# Patient Record
Sex: MALE | Race: WHITE | NOT HISPANIC OR LATINO | URBAN - NONMETROPOLITAN AREA
[De-identification: names, ages, dates, MRNs, and addresses within clinical notes are randomized per-mention and may not be internally consistent; named-entity substitution may affect disease eponyms.]

---

## 2019-08-02 ENCOUNTER — SPOT CHECK NEW (OUTPATIENT)
Dept: URBAN - NONMETROPOLITAN AREA CLINIC 5 | Facility: CLINIC | Age: 84
Setting detail: DERMATOLOGY
End: 2019-08-02

## 2019-08-02 DIAGNOSIS — L57.0 ACTINIC KERATOSIS: ICD-10-CM

## 2019-08-02 PROCEDURE — 11102 TANGNTL BX SKIN SINGLE LES: CPT

## 2019-08-09 ENCOUNTER — MOHS SURGERY-ROUTINE (OUTPATIENT)
Dept: URBAN - METROPOLITAN AREA CLINIC 15 | Facility: CLINIC | Age: 84
Setting detail: DERMATOLOGY
End: 2019-08-09

## 2019-08-09 DIAGNOSIS — D48.5 NEOPLASM OF UNCERTAIN BEHAVIOR OF SKIN: ICD-10-CM

## 2019-08-09 PROCEDURE — 13122 CMPLX RPR S/A/L ADDL 5 CM/>: CPT

## 2019-08-09 PROCEDURE — 17313 MOHS 1 STAGE T/A/L: CPT

## 2019-08-09 PROCEDURE — 13121 CMPLX RPR S/A/L 2.6-7.5 CM: CPT

## 2019-08-15 ENCOUNTER — MOHS SURGERY - FOLLOW UP (OUTPATIENT)
Dept: URBAN - METROPOLITAN AREA CLINIC 15 | Facility: CLINIC | Age: 84
Setting detail: DERMATOLOGY
End: 2019-08-15

## 2019-08-15 DIAGNOSIS — L30.9 DERMATITIS, UNSPECIFIED: ICD-10-CM

## 2019-08-15 DIAGNOSIS — L70.0 ACNE VULGARIS: ICD-10-CM

## 2019-08-15 PROCEDURE — 99024 POSTOP FOLLOW-UP VISIT: CPT

## 2019-08-22 ENCOUNTER — SUTURE REMOVAL (OUTPATIENT)
Dept: URBAN - METROPOLITAN AREA CLINIC 15 | Facility: CLINIC | Age: 84
Setting detail: DERMATOLOGY
End: 2019-08-22

## 2019-08-22 DIAGNOSIS — L57.0 ACTINIC KERATOSIS: ICD-10-CM

## 2019-08-22 PROCEDURE — 99024 POSTOP FOLLOW-UP VISIT: CPT

## 2023-09-02 ENCOUNTER — HOSPITAL ENCOUNTER (OUTPATIENT)
Dept: TELEMEDICINE | Facility: HOSPITAL | Age: 88
Discharge: HOME | End: 2023-09-02
Payer: MEDICARE

## 2023-09-02 ENCOUNTER — ANESTHESIA (OUTPATIENT)
Dept: INTERVENTIONAL RADIOLOGY/VASCULAR | Facility: HOSPITAL | Age: 88
DRG: 023 | End: 2023-09-02
Payer: MEDICARE

## 2023-09-02 ENCOUNTER — HOSPITAL ENCOUNTER (INPATIENT)
Facility: HOSPITAL | Age: 88
LOS: 4 days | Discharge: NURSING, REHAB OR LONG-TERM CARE | DRG: 023 | End: 2023-09-06
Attending: EMERGENCY MEDICINE | Admitting: INTERNAL MEDICINE
Payer: MEDICARE

## 2023-09-02 DIAGNOSIS — R53.1 LEFT-SIDED WEAKNESS: ICD-10-CM

## 2023-09-02 DIAGNOSIS — I63.511 ACUTE ISCHEMIC RIGHT MCA STROKE: ICD-10-CM

## 2023-09-02 DIAGNOSIS — I63.411 STROKE DUE TO EMBOLISM OF RIGHT MIDDLE CEREBRAL ARTERY: Primary | ICD-10-CM

## 2023-09-02 DIAGNOSIS — I63.9 CEREBRAL INFARCTION, UNSPECIFIED MECHANISM: ICD-10-CM

## 2023-09-02 DIAGNOSIS — I63.9 CVA (CEREBRAL VASCULAR ACCIDENT): ICD-10-CM

## 2023-09-02 PROBLEM — Z95.810 AICD (AUTOMATIC CARDIOVERTER/DEFIBRILLATOR) PRESENT: Status: ACTIVE | Noted: 2023-09-02

## 2023-09-02 PROBLEM — G93.6 CYTOTOXIC BRAIN EDEMA: Status: ACTIVE | Noted: 2023-09-02

## 2023-09-02 PROBLEM — I71.40 ABDOMINAL AORTIC ANEURYSM (AAA) WITHOUT RUPTURE: Status: ACTIVE | Noted: 2023-09-02

## 2023-09-02 PROBLEM — I10 PRIMARY HYPERTENSION: Status: ACTIVE | Noted: 2023-09-02

## 2023-09-02 PROCEDURE — 99223 PR INITIAL HOSPITAL CARE,LEVL III: ICD-10-PCS | Mod: ,,, | Performed by: PSYCHIATRY & NEUROLOGY

## 2023-09-02 PROCEDURE — 99291 PR CRITICAL CARE, E/M 30-74 MINUTES: ICD-10-PCS | Mod: ,,,

## 2023-09-02 PROCEDURE — D9220A PRA ANESTHESIA: ICD-10-PCS | Mod: CRNA,,, | Performed by: STUDENT IN AN ORGANIZED HEALTH CARE EDUCATION/TRAINING PROGRAM

## 2023-09-02 PROCEDURE — 99285 EMERGENCY DEPT VISIT HI MDM: CPT | Mod: 25

## 2023-09-02 PROCEDURE — 20000000 HC ICU ROOM

## 2023-09-02 PROCEDURE — 99291 CRITICAL CARE FIRST HOUR: CPT | Mod: ,,,

## 2023-09-02 PROCEDURE — D9220A PRA ANESTHESIA: Mod: CRNA,,, | Performed by: STUDENT IN AN ORGANIZED HEALTH CARE EDUCATION/TRAINING PROGRAM

## 2023-09-02 PROCEDURE — 63600175 PHARM REV CODE 636 W HCPCS: Performed by: STUDENT IN AN ORGANIZED HEALTH CARE EDUCATION/TRAINING PROGRAM

## 2023-09-02 PROCEDURE — D9220A PRA ANESTHESIA: ICD-10-PCS | Mod: ANES,,, | Performed by: ANESTHESIOLOGY

## 2023-09-02 PROCEDURE — 99223 1ST HOSP IP/OBS HIGH 75: CPT | Mod: ,,, | Performed by: PSYCHIATRY & NEUROLOGY

## 2023-09-02 PROCEDURE — 25000003 PHARM REV CODE 250: Performed by: STUDENT IN AN ORGANIZED HEALTH CARE EDUCATION/TRAINING PROGRAM

## 2023-09-02 PROCEDURE — D9220A PRA ANESTHESIA: Mod: ANES,,, | Performed by: ANESTHESIOLOGY

## 2023-09-02 PROCEDURE — 31500 INSERT EMERGENCY AIRWAY: CPT

## 2023-09-02 RX ORDER — PROPOFOL 10 MG/ML
VIAL (ML) INTRAVENOUS
Status: DISCONTINUED | OUTPATIENT
Start: 2023-09-02 | End: 2023-09-02

## 2023-09-02 RX ORDER — HEPARIN SODIUM 5000 [USP'U]/ML
5000 INJECTION, SOLUTION INTRAVENOUS; SUBCUTANEOUS EVERY 8 HOURS
Status: DISCONTINUED | OUTPATIENT
Start: 2023-09-03 | End: 2023-09-04

## 2023-09-02 RX ORDER — ROCURONIUM BROMIDE 10 MG/ML
INJECTION, SOLUTION INTRAVENOUS
Status: DISCONTINUED | OUTPATIENT
Start: 2023-09-02 | End: 2023-09-02

## 2023-09-02 RX ORDER — LIDOCAINE HYDROCHLORIDE 20 MG/ML
INJECTION INTRAVENOUS
Status: DISCONTINUED | OUTPATIENT
Start: 2023-09-02 | End: 2023-09-02

## 2023-09-02 RX ORDER — ASPIRIN 81 MG/1
81 TABLET ORAL DAILY
Status: DISCONTINUED | OUTPATIENT
Start: 2023-09-03 | End: 2023-09-03

## 2023-09-02 RX ORDER — SODIUM CHLORIDE 0.9 % (FLUSH) 0.9 %
10 SYRINGE (ML) INJECTION
Status: DISCONTINUED | OUTPATIENT
Start: 2023-09-02 | End: 2023-09-06 | Stop reason: HOSPADM

## 2023-09-02 RX ORDER — DEXAMETHASONE SODIUM PHOSPHATE 4 MG/ML
INJECTION, SOLUTION INTRA-ARTICULAR; INTRALESIONAL; INTRAMUSCULAR; INTRAVENOUS; SOFT TISSUE
Status: DISCONTINUED | OUTPATIENT
Start: 2023-09-02 | End: 2023-09-02

## 2023-09-02 RX ORDER — ATORVASTATIN CALCIUM 40 MG/1
40 TABLET, FILM COATED ORAL DAILY
Status: DISCONTINUED | OUTPATIENT
Start: 2023-09-03 | End: 2023-09-06 | Stop reason: HOSPADM

## 2023-09-02 RX ORDER — SUCCINYLCHOLINE CHLORIDE 20 MG/ML
INJECTION INTRAMUSCULAR; INTRAVENOUS
Status: DISCONTINUED | OUTPATIENT
Start: 2023-09-02 | End: 2023-09-02

## 2023-09-02 RX ORDER — POLYETHYLENE GLYCOL 3350 17 G/17G
17 POWDER, FOR SOLUTION ORAL DAILY
Status: DISCONTINUED | OUTPATIENT
Start: 2023-09-03 | End: 2023-09-04

## 2023-09-02 RX ORDER — NICARDIPINE HYDROCHLORIDE 0.2 MG/ML
0-15 INJECTION INTRAVENOUS CONTINUOUS
Status: DISCONTINUED | OUTPATIENT
Start: 2023-09-02 | End: 2023-09-04

## 2023-09-02 RX ORDER — ACETAMINOPHEN 325 MG/1
650 TABLET ORAL EVERY 6 HOURS PRN
Status: DISCONTINUED | OUTPATIENT
Start: 2023-09-02 | End: 2023-09-06 | Stop reason: HOSPADM

## 2023-09-02 RX ORDER — PHENYLEPHRINE HYDROCHLORIDE 10 MG/ML
INJECTION INTRAVENOUS
Status: DISCONTINUED | OUTPATIENT
Start: 2023-09-02 | End: 2023-09-02

## 2023-09-02 RX ORDER — ONDANSETRON 2 MG/ML
INJECTION INTRAMUSCULAR; INTRAVENOUS
Status: DISCONTINUED | OUTPATIENT
Start: 2023-09-02 | End: 2023-09-02

## 2023-09-02 RX ADMIN — ROCURONIUM BROMIDE 5 MG: 10 INJECTION, SOLUTION INTRAVENOUS at 08:09

## 2023-09-02 RX ADMIN — PHENYLEPHRINE HYDROCHLORIDE 100 MCG: 10 INJECTION INTRAVENOUS at 09:09

## 2023-09-02 RX ADMIN — SUCCINYLCHOLINE CHLORIDE 140 MG: 20 INJECTION, SOLUTION INTRAMUSCULAR; INTRAVENOUS at 08:09

## 2023-09-02 RX ADMIN — ONDANSETRON 4 MG: 2 INJECTION INTRAMUSCULAR; INTRAVENOUS at 08:09

## 2023-09-02 RX ADMIN — PROPOFOL 20 MG: 10 INJECTION, EMULSION INTRAVENOUS at 09:09

## 2023-09-02 RX ADMIN — PHENYLEPHRINE HYDROCHLORIDE 50 MCG: 10 INJECTION INTRAVENOUS at 09:09

## 2023-09-02 RX ADMIN — ROCURONIUM BROMIDE 25 MG: 10 INJECTION, SOLUTION INTRAVENOUS at 08:09

## 2023-09-02 RX ADMIN — PROPOFOL 110 MG: 10 INJECTION, EMULSION INTRAVENOUS at 08:09

## 2023-09-02 RX ADMIN — LIDOCAINE HYDROCHLORIDE 100 MG: 20 INJECTION INTRAVENOUS at 08:09

## 2023-09-02 RX ADMIN — ROCURONIUM BROMIDE 20 MG: 10 INJECTION, SOLUTION INTRAVENOUS at 09:09

## 2023-09-02 RX ADMIN — SUGAMMADEX 200 MG: 100 INJECTION, SOLUTION INTRAVENOUS at 10:09

## 2023-09-02 RX ADMIN — DEXAMETHASONE SODIUM PHOSPHATE 4 MG: 4 INJECTION, SOLUTION INTRAMUSCULAR; INTRAVENOUS at 08:09

## 2023-09-02 NOTE — HPI
89M       1450 got up to use the restroom, fell to the ground  Had left sided weakness, left facial droop, and slurred speech    High functioning    Maybe on aspirin. No AC medications.     AICD, AAA, HTN, hypothyroid    /87

## 2023-09-02 NOTE — CONSULTS
[facility]  Vascular Neurology  Comprehensive Stroke Center  TeleVascular Neurology Acute Consultation Note      Consults    Consulting Provider: PEDRO HUMPHRIES  Current Providers  No providers found    [facility] Emergency Department  Spoke hospital nurse at bedside with patient assisting consultant.     Patient information was obtained from patient.         Assessment/Plan:       Diagnoses:   Neuro  Left-sided weakness  Stroke due to occlusion of right middle cerebral artery  89M presenting with left-sided weakness and slurred speech. LKN 1450. Initial NIHSS 4.     NCCTH reviewed with no acute intracranial hemorrhage or evidence of large territory ischemia. He presented within the therapeutic window for IV thrombolysis; however, given rapid improvement of his symptoms - NIHSS 2 for facial droop and mild dysarthria - plan to defer treatment at this time.     Imaging:   - Recommend an expedited CTA head/neck to evaluate for potential symptomatic stenosis/occlusive lesion that might significantly increase risk of recurrent or worsening signs/symptoms.   - Recommend follow-up non-urgent MRI brain without contrast to evaluate for acute ischemia.  - If above studies are abnormal, please load images to tel[employer] imaging system and contact us to review.    Antithrombotics for secondary stroke prevention: Load clopidogrel 300mg x 1 now. Then start dual-antiplatelet therapy with ASA 81mg and clopidogrel 75mg daily for 21 days. Then continue ASA 81mg indefinitely.     Statins for secondary stroke prevention and hyperlipidemia, if present: Recommend initiation or continuation of a high-intensity statin for goal LDL < 70mg/dL.    Aggressive risk factor modification: HTN     Rehab efforts: The patient has been evaluated by a stroke team provider and the therapy needs have been fully considered based off the  presenting complaints and exam findings. The following therapy evaluations are needed: PT evaluate and treat, OT evaluate and treat, SLP evaluate and treat, PM&R evaluate for appropriate placement    Diagnostics recommended:   - CTA head/neck with contrast  - MRI brain without contrast   - TTE without bubble study, monitor on telemetry while admitted  - Labs: hemoglobin A1c (goal <7%), lipid panel (LDL goal <70mg/dL), TSH  - Treat hyperglycemia to achieve a blood glucose level in a range of 140-180 mg/dL and to closely monitor to prevent hypoglycemia (Class IIa, Level C-LD).    VTE prophylaxis: Enoxaparin 40 mg SQ every 24 hours  Mechanical prophylaxis: Place SCDs    BP parameters: Infarct: No intervention, SBP <220      Please contact us with any further questions or concerns or if patient has any acute neurological changes (new symptoms, worsening deficits).          STROKE DOCUMENTATION     Acute Stroke Times:   Acute Stroke Times   Last Known Normal Date: 09/02/23  Last Known Normal Time: 1450  Symptom Onset Date: 09/02/23  Symptom Onset Time: 1450  Stroke Team Called Date: 09/02/23  Stroke Team Called Time: 1611  Stroke Team Arrival Date: 09/02/23  Stroke Team Arrival Time: 1613  CT Interpretation Time: 1621  Thrombolytic Therapy Recommended: No    NIH Scale:  Interval: baseline  1a. Level of Consciousness: 0-->Alert, keenly responsive  1b. LOC Questions: 0-->Answers both questions correctly  1c. LOC Commands: 0-->Performs both tasks correctly  2. Best Gaze: 0-->Normal  3. Visual: 0-->No visual loss  4. Facial Palsy: 2-->Partial paralysis (total or near-total paralysis of lower face)  5a. Motor Arm, Left: 0-->No drift, limb holds 90 (or 45) degrees for full 10 secs  5b. Motor Arm, Right: 0-->No drift, limb holds 90 (or 45) degrees for full 10 secs  6a. Motor Leg, Left: 1-->Drift, leg falls by the end of the 5-sec period but does not hit bed  6b. Motor Leg, Right: 0-->No drift, leg holds 30 degree position for  full 5 secs  7. Limb Ataxia: 0-->Absent  8. Sensory: 0-->Normal, no sensory loss  9. Best Language: 0-->No aphasia, normal  10. Dysarthria: 1-->Mild-to-moderate dysarthria, patient slurs at least some words and, at worst, can be understood with some difficulty  11. Extinction and Inattention (formerly Neglect): 0-->No abnormality  Total (NIH Stroke Scale): 4     Modified [place] Score: 0  [place] Coma Scale:    ABCD2 Score:    [id number] Score:   HAS -BLED Score:   ICH Score:   Hunt & Danielle Classification:       There were no vitals taken for this visit.  Eligible for thrombolytic therapy?: Yes  Thrombolytic therapy recomended: Thrombolytic therapy not recommended due to Mild Non-Disabling Symptoms  Possible Interventional Revascularization Candidate? Awaiting CTA results from Sp[facility] for determination     Disposition Recommendation: pending further studies    Subjective:     History of Present Illness:  89M with a PMHx significant for AAA, AICD, HTN, hypothyroidism presenting with left hemiparesis, left facial droop, and slurred speech. LKN 1450. He got up to use the bathroom and fell to the ground. His family found him down with symptoms.     Maybe on aspirin. No AC medications.     /87        On his exam, he has left lower facial weakness. Mild dysarthria with intelligible speech. BUE antigravity without drift. LLE antigravity with drift (which later improved). RLE antigravity without drift. No sensory symptoms.     Mild left LE weakness on confrontation exam. Patient was able to ambulate with minimal assist.     Recommended the emergency room physician to have a brief discussion with the patient and/or family if available regarding the  risks and benefits of treatment, and to briefly document the occurrence of that discussion in his clinical encounter note.     The attending portion of this evaluation, treatment, and documentation was performed per Florina Delvalle MD via audiovisual.    Billing code:   (non-intervention mild to moderate stroke, TIA, some mimics)      This patient has a critical neurological condition/illness, with some potential for high morbidity and mortality.  There is a moderate probability for acute neurological change leading to clinical and possibly life-threatening deterioration requiring highest level of physician preparedness for urgent intervention.  Care was coordinated with other physicians involved in the patient's care.  Radiologic studies and laboratory data were reviewed and interpreted, and plan of care was re-assessed based on the results.  Diagnosis, treatment options and prognosis may have been discussed with the patient and/or family members or caregiver.    In your opinion, this was a: Tier 1 Van Negative    Consult End Time: 5:14 PM     Florina Delvalle MD, PhD  [facility] Stroke Center  Vascular Neurology   [facility]

## 2023-09-03 PROBLEM — I61.9 INTRAPARENCHYMAL HEMORRHAGE OF BRAIN: Status: ACTIVE | Noted: 2023-09-03

## 2023-09-03 LAB
ABO + RH BLD: NORMAL
ALBUMIN SERPL BCP-MCNC: 3.6 G/DL (ref 3.5–5.2)
ALP SERPL-CCNC: 109 U/L (ref 55–135)
ALT SERPL W/O P-5'-P-CCNC: 16 U/L (ref 10–44)
ANION GAP SERPL CALC-SCNC: 11 MMOL/L (ref 8–16)
APTT PPP: 31.6 SEC (ref 21–32)
ASCENDING AORTA: 4.29 CM
AST SERPL-CCNC: 42 U/L (ref 10–40)
AV INDEX (PROSTH): 0.35
AV MEAN GRADIENT: 12 MMHG
AV PEAK GRADIENT: 18 MMHG
AV VALVE AREA BY VELOCITY RATIO: 1.74 CM²
AV VALVE AREA: 2.07 CM²
AV VELOCITY RATIO: 0.3
BASOPHILS # BLD AUTO: 0.02 K/UL (ref 0–0.2)
BASOPHILS # BLD AUTO: 0.02 K/UL (ref 0–0.2)
BASOPHILS NFR BLD: 0.2 % (ref 0–1.9)
BASOPHILS NFR BLD: 0.2 % (ref 0–1.9)
BILIRUB SERPL-MCNC: 0.6 MG/DL (ref 0.1–1)
BILIRUB UR QL STRIP: NEGATIVE
BLD GP AB SCN CELLS X3 SERPL QL: NORMAL
BSA FOR ECHO PROCEDURE: 1.89 M2
BUN SERPL-MCNC: 22 MG/DL (ref 10–30)
CALCIUM SERPL-MCNC: 8.9 MG/DL (ref 8.7–10.5)
CHLORIDE SERPL-SCNC: 105 MMOL/L (ref 95–110)
CHOLEST SERPL-MCNC: 162 MG/DL (ref 120–199)
CHOLEST/HDLC SERPL: 4.1 {RATIO} (ref 2–5)
CLARITY UR REFRACT.AUTO: CLEAR
CO2 SERPL-SCNC: 22 MMOL/L (ref 23–29)
COLOR UR AUTO: YELLOW
CREAT SERPL-MCNC: 1.1 MG/DL (ref 0.5–1.4)
CV ECHO LV RWT: 0.56 CM
DIFFERENTIAL METHOD: ABNORMAL
DIFFERENTIAL METHOD: ABNORMAL
DOP CALC AO PEAK VEL: 2.13 M/S
DOP CALC AO VTI: 37.95 CM
DOP CALC LVOT AREA: 5.9 CM2
DOP CALC LVOT DIAMETER: 2.74 CM
DOP CALC LVOT PEAK VEL: 0.63 M/S
DOP CALC LVOT STROKE VOLUME: 78.56 CM3
DOP CALCLVOT PEAK VEL VTI: 13.33 CM
E WAVE DECELERATION TIME: 234.4 MSEC
E/A RATIO: 1.41
E/E' RATIO: 10.13 M/S
ECHO LV POSTERIOR WALL: 1.18 CM (ref 0.6–1.1)
EOSINOPHIL # BLD AUTO: 0.1 K/UL (ref 0–0.5)
EOSINOPHIL # BLD AUTO: 0.1 K/UL (ref 0–0.5)
EOSINOPHIL NFR BLD: 1.3 % (ref 0–8)
EOSINOPHIL NFR BLD: 1.3 % (ref 0–8)
ERYTHROCYTE [DISTWIDTH] IN BLOOD BY AUTOMATED COUNT: 13.6 % (ref 11.5–14.5)
ERYTHROCYTE [DISTWIDTH] IN BLOOD BY AUTOMATED COUNT: 13.6 % (ref 11.5–14.5)
EST. GFR  (NO RACE VARIABLE): >60 ML/MIN/1.73 M^2
ESTIMATED AVG GLUCOSE: 108 MG/DL (ref 68–131)
FRACTIONAL SHORTENING: 28 % (ref 28–44)
GLUCOSE SERPL-MCNC: 130 MG/DL (ref 70–110)
GLUCOSE UR QL STRIP: NEGATIVE
HBA1C MFR BLD: 5.4 % (ref 4–5.6)
HCT VFR BLD AUTO: 39.4 % (ref 40–54)
HCT VFR BLD AUTO: 39.4 % (ref 40–54)
HDLC SERPL-MCNC: 40 MG/DL (ref 40–75)
HDLC SERPL: 24.7 % (ref 20–50)
HGB BLD-MCNC: 13.3 G/DL (ref 14–18)
HGB BLD-MCNC: 13.3 G/DL (ref 14–18)
HGB UR QL STRIP: NEGATIVE
IMM GRANULOCYTES # BLD AUTO: 0.03 K/UL (ref 0–0.04)
IMM GRANULOCYTES # BLD AUTO: 0.03 K/UL (ref 0–0.04)
IMM GRANULOCYTES NFR BLD AUTO: 0.3 % (ref 0–0.5)
IMM GRANULOCYTES NFR BLD AUTO: 0.3 % (ref 0–0.5)
INR PPP: 1 (ref 0.8–1.2)
INTERVENTRICULAR SEPTUM: 1.16 CM (ref 0.6–1.1)
IVRT: 94.2 MSEC
KETONES UR QL STRIP: NEGATIVE
LA MAJOR: 6.93 CM
LA MINOR: 6.87 CM
LA WIDTH: 5.18 CM
LDLC SERPL CALC-MCNC: 101.6 MG/DL (ref 63–159)
LEFT ATRIUM SIZE: 5.21 CM
LEFT ATRIUM VOLUME INDEX MOD: 64.7 ML/M2
LEFT ATRIUM VOLUME INDEX: 84.6 ML/M2
LEFT ATRIUM VOLUME MOD: 120.93 CM3
LEFT ATRIUM VOLUME: 158.28 CM3
LEFT INTERNAL DIMENSION IN SYSTOLE: 3.04 CM (ref 2.1–4)
LEFT VENTRICLE DIASTOLIC VOLUME INDEX: 41.92 ML/M2
LEFT VENTRICLE DIASTOLIC VOLUME: 78.39 ML
LEFT VENTRICLE MASS INDEX: 92 G/M2
LEFT VENTRICLE SYSTOLIC VOLUME INDEX: 19.4 ML/M2
LEFT VENTRICLE SYSTOLIC VOLUME: 36.23 ML
LEFT VENTRICULAR INTERNAL DIMENSION IN DIASTOLE: 4.2 CM (ref 3.5–6)
LEFT VENTRICULAR MASS: 171.69 G
LEUKOCYTE ESTERASE UR QL STRIP: NEGATIVE
LV LATERAL E/E' RATIO: 8.44 M/S
LV SEPTAL E/E' RATIO: 12.67 M/S
LYMPHOCYTES # BLD AUTO: 0.8 K/UL (ref 1–4.8)
LYMPHOCYTES # BLD AUTO: 0.8 K/UL (ref 1–4.8)
LYMPHOCYTES NFR BLD: 9 % (ref 18–48)
LYMPHOCYTES NFR BLD: 9 % (ref 18–48)
MAGNESIUM SERPL-MCNC: 2.1 MG/DL (ref 1.6–2.6)
MCH RBC QN AUTO: 32.7 PG (ref 27–31)
MCH RBC QN AUTO: 32.7 PG (ref 27–31)
MCHC RBC AUTO-ENTMCNC: 33.8 G/DL (ref 32–36)
MCHC RBC AUTO-ENTMCNC: 33.8 G/DL (ref 32–36)
MCV RBC AUTO: 97 FL (ref 82–98)
MCV RBC AUTO: 97 FL (ref 82–98)
MONOCYTES # BLD AUTO: 0.2 K/UL (ref 0.3–1)
MONOCYTES # BLD AUTO: 0.2 K/UL (ref 0.3–1)
MONOCYTES NFR BLD: 2.2 % (ref 4–15)
MONOCYTES NFR BLD: 2.2 % (ref 4–15)
MV PEAK A VEL: 0.54 M/S
MV PEAK E VEL: 0.76 M/S
NEUTROPHILS # BLD AUTO: 8 K/UL (ref 1.8–7.7)
NEUTROPHILS # BLD AUTO: 8 K/UL (ref 1.8–7.7)
NEUTROPHILS NFR BLD: 87 % (ref 38–73)
NEUTROPHILS NFR BLD: 87 % (ref 38–73)
NITRITE UR QL STRIP: NEGATIVE
NONHDLC SERPL-MCNC: 122 MG/DL
NRBC BLD-RTO: 0 /100 WBC
NRBC BLD-RTO: 0 /100 WBC
PH UR STRIP: 7 [PH] (ref 5–8)
PHOSPHATE SERPL-MCNC: 3 MG/DL (ref 2.7–4.5)
PISA TR MAX VEL: 2.6 M/S
PLATELET # BLD AUTO: 153 K/UL (ref 150–450)
PLATELET # BLD AUTO: 153 K/UL (ref 150–450)
PMV BLD AUTO: 9.9 FL (ref 9.2–12.9)
PMV BLD AUTO: 9.9 FL (ref 9.2–12.9)
POTASSIUM SERPL-SCNC: 4.2 MMOL/L (ref 3.5–5.1)
PROT SERPL-MCNC: 6.4 G/DL (ref 6–8.4)
PROT UR QL STRIP: NEGATIVE
PROTHROMBIN TIME: 10.9 SEC (ref 9–12.5)
RA MAJOR: 6.25 CM
RA PRESSURE ESTIMATED: 8 MMHG
RA WIDTH: 4.25 CM
RBC # BLD AUTO: 4.07 M/UL (ref 4.6–6.2)
RBC # BLD AUTO: 4.07 M/UL (ref 4.6–6.2)
RIGHT VENTRICULAR END-DIASTOLIC DIMENSION: 3.16 CM
RV TB RVSP: 11 MMHG
SINUS: 4.06 CM
SODIUM SERPL-SCNC: 138 MMOL/L (ref 136–145)
SP GR UR STRIP: >1.03 (ref 1–1.03)
SPECIMEN OUTDATE: NORMAL
STJ: 3.41 CM
T4 FREE SERPL-MCNC: 0.98 NG/DL (ref 0.71–1.51)
TDI LATERAL: 0.09 M/S
TDI SEPTAL: 0.06 M/S
TDI: 0.08 M/S
TR MAX PG: 27 MMHG
TRICUSPID ANNULAR PLANE SYSTOLIC EXCURSION: 1.13 CM
TRIGL SERPL-MCNC: 102 MG/DL (ref 30–150)
TSH SERPL DL<=0.005 MIU/L-ACNC: 0.27 UIU/ML (ref 0.4–4)
TV REST PULMONARY ARTERY PRESSURE: 35 MMHG
URN SPEC COLLECT METH UR: ABNORMAL
WBC # BLD AUTO: 9.19 K/UL (ref 3.9–12.7)
WBC # BLD AUTO: 9.19 K/UL (ref 3.9–12.7)
Z-SCORE OF LEFT VENTRICULAR DIMENSION IN END DIASTOLE: -2.16
Z-SCORE OF LEFT VENTRICULAR DIMENSION IN END SYSTOLE: -0.44

## 2023-09-03 PROCEDURE — 92610 EVALUATE SWALLOWING FUNCTION: CPT

## 2023-09-03 PROCEDURE — 85730 THROMBOPLASTIN TIME PARTIAL: CPT

## 2023-09-03 PROCEDURE — 83036 HEMOGLOBIN GLYCOSYLATED A1C: CPT

## 2023-09-03 PROCEDURE — 80053 COMPREHEN METABOLIC PANEL: CPT

## 2023-09-03 PROCEDURE — 81003 URINALYSIS AUTO W/O SCOPE: CPT

## 2023-09-03 PROCEDURE — 83735 ASSAY OF MAGNESIUM: CPT

## 2023-09-03 PROCEDURE — 97535 SELF CARE MNGMENT TRAINING: CPT

## 2023-09-03 PROCEDURE — 99233 SBSQ HOSP IP/OBS HIGH 50: CPT | Mod: ,,, | Performed by: PSYCHIATRY & NEUROLOGY

## 2023-09-03 PROCEDURE — 80061 LIPID PANEL: CPT

## 2023-09-03 PROCEDURE — 84443 ASSAY THYROID STIM HORMONE: CPT

## 2023-09-03 PROCEDURE — 97165 OT EVAL LOW COMPLEX 30 MIN: CPT

## 2023-09-03 PROCEDURE — 97116 GAIT TRAINING THERAPY: CPT

## 2023-09-03 PROCEDURE — 20000000 HC ICU ROOM

## 2023-09-03 PROCEDURE — 93010 ELECTROCARDIOGRAM REPORT: CPT | Mod: ,,, | Performed by: INTERNAL MEDICINE

## 2023-09-03 PROCEDURE — 97162 PT EVAL MOD COMPLEX 30 MIN: CPT

## 2023-09-03 PROCEDURE — 36415 COLL VENOUS BLD VENIPUNCTURE: CPT | Performed by: INTERNAL MEDICINE

## 2023-09-03 PROCEDURE — 85025 COMPLETE CBC W/AUTO DIFF WBC: CPT

## 2023-09-03 PROCEDURE — 84439 ASSAY OF FREE THYROXINE: CPT

## 2023-09-03 PROCEDURE — 93005 ELECTROCARDIOGRAM TRACING: CPT

## 2023-09-03 PROCEDURE — 97112 NEUROMUSCULAR REEDUCATION: CPT

## 2023-09-03 PROCEDURE — 84100 ASSAY OF PHOSPHORUS: CPT

## 2023-09-03 PROCEDURE — 63600175 PHARM REV CODE 636 W HCPCS

## 2023-09-03 PROCEDURE — 85610 PROTHROMBIN TIME: CPT

## 2023-09-03 PROCEDURE — 97530 THERAPEUTIC ACTIVITIES: CPT

## 2023-09-03 PROCEDURE — 93010 EKG 12-LEAD: ICD-10-PCS | Mod: ,,, | Performed by: INTERNAL MEDICINE

## 2023-09-03 PROCEDURE — 86900 BLOOD TYPING SEROLOGIC ABO: CPT

## 2023-09-03 PROCEDURE — 99233 PR SUBSEQUENT HOSPITAL CARE,LEVL III: ICD-10-PCS | Mod: ,,, | Performed by: PSYCHIATRY & NEUROLOGY

## 2023-09-03 PROCEDURE — 25000003 PHARM REV CODE 250

## 2023-09-03 RX ORDER — FAMOTIDINE 20 MG/1
20 TABLET, FILM COATED ORAL DAILY
Status: DISCONTINUED | OUTPATIENT
Start: 2023-09-04 | End: 2023-09-06 | Stop reason: HOSPADM

## 2023-09-03 RX ADMIN — HEPARIN SODIUM 5000 UNITS: 5000 INJECTION INTRAVENOUS; SUBCUTANEOUS at 05:09

## 2023-09-03 RX ADMIN — ATORVASTATIN CALCIUM 40 MG: 40 TABLET, FILM COATED ORAL at 08:09

## 2023-09-03 RX ADMIN — POLYETHYLENE GLYCOL 3350 17 G: 17 POWDER, FOR SOLUTION ORAL at 08:09

## 2023-09-03 RX ADMIN — ASPIRIN 81 MG: 81 TABLET, COATED ORAL at 08:09

## 2023-09-03 NOTE — ASSESSMENT & PLAN NOTE
89 yo M with PMHx of AAA, AICD, and HTN presents as a transfer from [facility] for possible intervention. Patient presented to OSH with left sided weakness, facial droop and dysarthria. Reportedly his LKN was 1450. Patient got up to use the bathroom, fell and was found down by family who noticed confusion, LSW and facial droop. At baseline is highly functional and performs all of his ADLs independently. Telestroke with Dr. Delvalle, patient with improving symptoms so no thrombolytic was recommended. Imaging concerning for R MCA occlusion for which patient was transferred to [facility] for possible intervention.     Patient was taken to IR yesterday, no successful intervention TICI0 of R M2. CTH post IR with R MCA frontal opercular infarct, possible SAH vs contrast and new R temporal IPH. Exam notable for R gaze(crosses midline), LUE/LLE drift, dysarthria, L hemihypoesthesia and L neglect. Recommend holding ASA in setting of possible new IPH. Will continue to follow.       Antithrombotics for secondary stroke prevention: Antiplatelets: None: Intracerebral Hemorrhage     Statins for secondary stroke prevention and hyperlipidemia, if present:   Statins: Atorvastatin- 40 mg daily    Aggressive risk factor modification: HTN, AAA     Rehab efforts: The patient has been evaluated by a stroke team provider and the therapy needs have been fully considered based off the presenting complaints and exam findings. The following therapy evaluations are needed: PT evaluate and treat, OT evaluate and treat, SLP evaluate and treat, PM&R evaluate for appropriate placement    Diagnostics ordered/pending: CT scan of head without contrast to asses brain parenchyma, MRI head without contrast to assess brain parenchyma, TTE to assess cardiac function/status     VTE prophylaxis: Mechanical prophylaxis: Place SCDs    BP parameters:SBP<140 in the setting of possible new IPH

## 2023-09-03 NOTE — SUBJECTIVE & OBJECTIVE
Neurologic Chief Complaint:  R M2 TICI0    Subjective:     Interval History: Patient is seen for follow-up neurological assessment and treatment recommendations:     Patient was taken to IR yesterday, no successful intervention TICI0 of R M2. CTH post IR with R MCA frontal opercular infarct, possible SAH vs contrast and new R temporal IPH. Exam notable for R gaze(crosses midline), LUE/LLE drift, dysarthria, L hemihypoesthesia and L neglect. Recommend holding ASA in setting of possible new IPH. Will continue to follow.     HPI, Past Medical, Family, and Social History remains the same as documented in the initial encounter.     Review of Systems   HENT:  Positive for hearing loss (at baseline).    Neurological:  Positive for speech difficulty, weakness and numbness.     Scheduled Meds:   aspirin  81 mg Oral Daily    atorvastatin  40 mg Oral Daily    heparin (porcine)  5,000 Units Subcutaneous Q8H    polyethylene glycol  17 g Oral Daily     Continuous Infusions:   niCARdipine       PRN Meds:acetaminophen, sodium chloride 0.9%, sodium chloride 0.9%    Objective:     Vital Signs (Most Recent):  Temp: 98.2 °F (36.8 °C) (09/03/23 1102)  Pulse: 70 (09/03/23 1202)  Resp: 18 (09/03/23 1202)  BP: 109/68 (09/03/23 1202)  SpO2: 95 % (09/03/23 1202)  BP Location: Left arm    Vital Signs Range (Last 24H):  Temp:  [97.6 °F (36.4 °C)-98.2 °F (36.8 °C)]   Pulse:  [69-93]   Resp:  [12-31]   BP: ()/()   SpO2:  [91 %-95 %]   BP Location: Left arm       Physical Exam  Vitals and nursing note reviewed.   Constitutional:       General: He is not in acute distress.  HENT:      Head: Normocephalic and atraumatic.      Right Ear: External ear normal.      Left Ear: External ear normal.   Eyes:      Comments: R gaze, crosses midline   Cardiovascular:      Rate and Rhythm: Normal rate.   Pulmonary:      Effort: Pulmonary effort is normal. No respiratory distress.   Abdominal:      General: There is no distension.   Skin:      "General: Skin is warm and dry.   Neurological:      Mental Status: He is alert.      Cranial Nerves: Cranial nerve deficit present.      Sensory: Sensory deficit (L) present.      Motor: Weakness (L) present.              Neurological Exam:   LOC: alert  Attention Span: Good   Language: No aphasia  Articulation: Dysarthria  Orientation: oriented to person and place  EOM (CN III, IV, VI): R gaze, crosses midline  Facial Movement (CN VII): Lower facial weakness on the Left  Motor: Left hemiparesis with drift, R spontaneous  Sensation: L hemihypoesthesia  L sided neglect  Tone: Normal tone throughout    Laboratory:  CMP:   Recent Labs   Lab 09/03/23 0027   CALCIUM 8.9   ALBUMIN 3.6   PROT 6.4      K 4.2   CO2 22*      BUN 22   CREATININE 1.1   ALKPHOS 109   ALT 16   AST 42*   BILITOT 0.6     BMP:   Recent Labs   Lab 09/03/23 0027      K 4.2      CO2 22*   BUN 22   CREATININE 1.1   CALCIUM 8.9     CBC:   Recent Labs   Lab 09/03/23 0027   WBC 9.19  9.19   RBC 4.07*  4.07*   HGB 13.3*  13.3*   HCT 39.4*  39.4*     153   MCV 97  97   MCH 32.7*  32.7*   MCHC 33.8  33.8     Lipid Panel:   Recent Labs   Lab 09/03/23 0027   CHOL 162   LDLCALC 101.6   HDL 40   TRIG 102     Coagulation:   Recent Labs   Lab 09/03/23 0027   INR 1.0   APTT 31.6     Platelet Aggregation Study: No results for input(s): "PLTAGG", "PLTAGINTERP", "PLTAGREGLACO", "ADPPLTAGGREG" in the last 168 hours.  Hgb A1C:   Recent Labs   Lab 09/03/23 0027   HGBA1C 5.4     TSH:   Recent Labs   Lab 09/03/23 0027   TSH 0.273*       Diagnostic Results     Brain/Vessel Imaging   CT WO 9/3/2023 1218  Stable appearance of the brain with recent right MCA infarct.  Stable intracranial hemorrhage.  No midline shift.    [facility] WO 9/3/2023 0805  Right MCA frontal opercular infarct.  Prominent subarachnoid hemorrhage/contrast.  New right temporal lobe intraparenchymal hemorrhage with trace intraventricular hemorrhage.  No significant " midline shift    [facility] WO 9/2/2023  Moderate subarachnoid hemorrhage in the sylvian fissure and in the gyri of the temporoparietal lobe on the right.     Residual contrast within the [id number] vessels from previous angiogram.     No evidence of intraparenchymal or interventricular hemorrhage.     No evidence of cortical infarct.    IR angio 9/2/2023  1.    Right M2 LVO s/p direct catheter aspiration x4 & mechanical thrombectomy x1 (TICI 0)  2.    Otherwise normal cerebral angiogram.    CTA at OSH per paper read with R MCA occlusion    Cardiac Imaging   Echo pending

## 2023-09-03 NOTE — ASSESSMENT & PLAN NOTE
Daughters provided AICD card. Submitted to radiology and in chart to see if MRI compatible, pending representative

## 2023-09-03 NOTE — CONSULTS
[facility] - Emergency Dept  Vascular Neurology  Comprehensive Stroke Center  Consult Note    Consults  Assessment/Plan:     Patient is a 89 y.o. year old male with:    Stroke due to embolism of right middle cerebral artery  89 yo M with PMHx of AAA, AICD, and HTN presents as a transfer from [facility] for possible intervention. Patient presented to OSH with left sided weakness, facial droop and dysarthria. Reportedly his LKN was 1450. Patient got up to use the bathroom, fell and was found down by family who noticed confusion, LSW and facial droop. At baseline is highly functional and performs all of his ADLs independently. Telestroke with Dr. Delvalle, patient with improving symptoms so no thrombolytic was recommended. Imaging concerning for R MCA occlusion for which [place] [facility] [place].     -Patient brought to ED32 on arrival and taken to IR  -On arrival, his exam was significant for mild LFD. No drift was noted in upper or lower extremities and no dysarthric speech appreciated. Daughter at bedside confirms speech is normal. She also notes, patient with significant improvement moving the left side when previously was unable to do so. Per EMS crew, when they arrived to OSH ED, patient was noted to have only left facial droop and no other deficits.  -No repeat imaging obtained on arrival. Taken to IR shortly after arrival.  -case discussed with stroke fellow and staff    Antithrombotics for secondary stroke prevention: Antiplatelets: Aspirin: 81 mg daily  Clopidogrel: 75 mg daily pending repeat imaging post IR if stable    Statins for secondary stroke prevention and hyperlipidemia, if present:   Statins: Atorvastatin- 40 mg daily    Aggressive risk factor modification: HTN, AAA     Rehab efforts: The patient has been evaluated by a stroke team provider and the therapy needs have been fully considered based off the presenting complaints and exam findings. The following therapy  evaluations are needed: PT evaluate and treat, OT evaluate and treat, SLP evaluate and treat, PM&R evaluate for appropriate placement    Diagnostics ordered/pending: CT scan of head without contrast to asses brain parenchyma, HgbA1C to assess blood glucose levels, Lipid Profile to assess cholesterol levels, MRI head without contrast to assess brain parenchyma, TTE to assess cardiac function/status , TSH to assess thyroid function    VTE prophylaxis: Mechanical prophylaxis: Place SCDs    BP parameters: Infarct: Post sucessful thrombectomy, SBP <140  Post unsucessful thrombectomy, SBP <220        Primary hypertension  Stroke RF  SBP<140 post successful thrombectomy   SBP<220 post unsuccessful thrombectomy        STROKE DOCUMENTATION     Acute Stroke Times   Last Known Normal Date: 09/02/23  Last Known Normal Time: 1450  Symptom Onset Date: 09/02/23  Symptom Onset Time: 1450  Stroke Team Called Date: 09/02/23  Stroke Team Called Time: 2024  Stroke Team Arrival Date: 09/02/23  Stroke Team Arrival Time: 2025  CT Interpretation Time:  (done at OSH, none done on arrival)  Thrombolytic Therapy Recommended: No  Thrombectomy Recommended: Yes  Decision to Treat Time for IR:  (decision made by IR team prior to arrival around 1934)    NIH Scale:  1a. Level of Consciousness: 0-->Alert, keenly responsive  1b. LOC Questions: 0-->Answers both questions correctly  1c. LOC Commands: 0-->Performs both tasks correctly  2. Best Gaze: 0-->Normal  3. Visual: 0-->No visual loss  4. Facial Palsy: 1-->Minor paralysis (flattened nasolabial fold, asymmetry on smiling)  5a. Motor Arm, Left: 0-->No drift, limb holds 90 (or 45) degrees for full 10 secs  5b. Motor Arm, Right: 0-->No drift, limb holds 90 (or 45) degrees for full 10 secs  6a. Motor Leg, Left: 0-->No drift, leg holds 30 degree position for full 5 secs  6b. Motor Leg, Right: 0-->No drift, leg holds 30 degree position for full 5 secs  7. Limb Ataxia: 0-->Absent  8. Sensory: 0-->Normal,  no sensory loss  9. Best Language: 0-->No aphasia, normal  10. Dysarthria: 0-->Normal  11. Extinction and Inattention (formerly Neglect): 0-->No abnormality  Total (NIH Stroke Scale): 1    Modified [place] Score: 0  [place] Coma Scale:    ABCD2 Score:    [id number] Score:   HAS -BLED Score:   ICH Score:   Hunt & Danielle Classification:       Thrombolysis Candidate? No, not recommended by telestroke provider due to rapidly improving symptoms and NIHSS<6    Delays to Thrombolysis?  No    Interventional Revascularization Candidate?   Is the patient eligible for mechanical endovascular reperfusion (BALTAZAR)?  Yes    Delays to Thrombectomy? No    Hemorrhagic change of an Ischemic Stroke: Does this patient have an ischemic stroke with hemorrhagic changes? No     Subjective:     History of Present Illness:  89 yo M with PMHx of AAA, AICD, and HTN presents as a transfer from [facility] for possible intervention. Patient presented to OSH with left sided weakness, facial droop and dysarthria. Reportedly his LKN was 1450. Patient got up to use the bathroom, fell and was found down by family who noticed confusion, LSW and facial droop. At baseline is highly functional and performs all of his ADLs independently. Telestroke with Dr. Delvalle, patient with improving symptoms so no thrombolytic was recommended. Imaging concerning for R MCA occlusion for which patient was [place] [facility]. On arrival to [facility], patient with left facial droop but no current left sided weakness. Daughter at the bedside reports his symptoms have significantly improved and besides the current left facial droop, he is at his baseline speech and moving everything as normal.           Past medical history: AAA, AICD, HTN  No pertinent past surgical history   No pertinent family history     Review of patient's allergies indicates:  No Known Allergies    Medications: I have reviewed the current medication administration record.    (Not  "in a hospital admission)      Review of Systems   Neurological:  Positive for facial asymmetry, speech difficulty and weakness.     Objective:     Vital Signs (Most Recent):  Temp: 97.6 °F (36.4 °C) (09/02/23 2021)  Pulse: 80 (09/02/23 2024)  Resp: 20 (09/02/23 2024)  BP: (!) 181/101 (09/02/23 2024)  SpO2: 95 % (09/02/23 2024)    Vital Signs Range (Last 24H):  Temp:  [97.6 °F (36.4 °C)]   Pulse:  [80-93]   Resp:  [12-20]   BP: (135-181)/()   SpO2:  [94 %-95 %]        Physical Exam  Vitals and nursing note reviewed.   Constitutional:       General: He is not in acute distress.  HENT:      Head: Normocephalic.      Ears:      Comments: Hard of hearing     Nose: Nose normal.   Eyes:      Conjunctiva/sclera: Conjunctivae normal.   Cardiovascular:      Rate and Rhythm: Normal rate.   Pulmonary:      Effort: Pulmonary effort is normal. No respiratory distress.   Abdominal:      General: There is no distension.   Skin:     General: Skin is warm and dry.   Neurological:      Cranial Nerves: Cranial nerve deficit present.      Comments: Currently no dysarthric speech or drift noted in upper or lower extremities. Daughter at bedside confirms his speech is at his baseline    +LFD   Psychiatric:         Behavior: Behavior normal.              Neurological Exam:   LOC: alert  Attention Span: Good   Language: No aphasia  Articulation: No dysarthria  Orientation: oriented to person and place  Visual Fields: Full  EOM (CN III, IV, VI): Full/intact  Facial Movement (CN VII): Lower facial weakness on the Left  Motor: JAN and AG, symmetric strength in upper extremities  Sensation: Intact to light touch  Tone: Normal tone throughout      Laboratory:  CMP: No results for input(s): "GLUCOSE", "CALCIUM", "ALBUMIN", "PROT", "NA", "K", "CO2", "CL", "BUN", "CREATININE", "ALKPHOS", "ALT", "AST", "BILITOT" in the last 24 hours.  CBC: No results for input(s): "WBC", "RBC", "HGB", "HCT", "PLT", "MCV", "MCH", "MCHC" in the last 168 " "hours.  Lipid Panel: No results for input(s): "CHOL", "LDLCALC", "HDL", "TRIG" in the last 168 hours.  Coagulation: No results for input(s): "PT", "INR", "APTT" in the last 168 hours.  Hgb A1C: No results for input(s): "HGBA1C" in the last 168 hours.  TSH: No results for input(s): "TSH" in the last 168 hours.    Diagnostic Results:      Brain/Vessel imaging:  CTA at OSH per paper read with R MCA occlusion          Matilde Mayo PAJOSE  Comprehensive Stroke Center  Department of Vascular Neurology   [facility] - Emergency Dept   "

## 2023-09-03 NOTE — ED PROVIDER NOTES
Encounter Date: 9/2/2023       History     Chief Complaint   Patient presents with    Transfer     [facility] transfer. For IR. GCS 15     HPI  89 y/o M with medical history of HTN, HLD, AICD placement and AAA transferred from OSH for neurovascular eval. Pt LKN approx 2:50pm. Noted at OSH to have left sided facial droop, slurred speech and left sided weakness. Telestoke eval andpt transferred for possible IR     Review of patient's allergies indicates:  No Known Allergies    PMH: HTN, HLD    No past surgical history on file.    No family history on file.     Review of Systems   Constitutional:  Negative for fever.   HENT:  Positive for voice change.    Neurological:  Positive for weakness.       Physical Exam     Initial Vitals [09/02/23 2021]   BP Pulse Resp Temp SpO2   (!) 135/97 93 12 97.6 °F (36.4 °C) (!) 94 %      MAP       --         Physical Exam    Nursing note and vitals reviewed.  Constitutional: He appears well-developed. No distress.   HENT:   Head: Normocephalic and atraumatic.   Eyes: Pupils are equal, round, and reactive to light.   Neck: Neck supple.   Cardiovascular:  Normal rate.           Pulmonary/Chest: Breath sounds normal.   Abdominal: Abdomen is soft.   Musculoskeletal:      Cervical back: Neck supple.     Neurological: GCS score is 15. GCS eye subscore is 4. GCS verbal subscore is 5. GCS motor subscore is 6.   Left upper and lower extremity weakness   Skin: Skin is warm and dry.         ED Course   Procedures  Labs Reviewed   URINALYSIS, REFLEX TO URINE CULTURE   LIPID PANEL   HEMOGLOBIN A1C   TSH   APTT   PROTIME-INR   PHOSPHORUS   MAGNESIUM   CBC W/ AUTO DIFFERENTIAL   COMPREHENSIVE METABOLIC PANEL   CBC W/ AUTO DIFFERENTIAL   TYPE & SCREEN   POCT GLUCOSE MONITORING CONTINUOUS          Imaging Results              IR Angiogram Cerebral Intracranial ea Add vessel (In process)                      Medications   sodium chloride 0.9% flush 10 mL (has no administration in time range)    niCARdipine 40 mg/200 mL (0.2 mg/mL) infusion (has no administration in time range)   sodium chloride 0.9% flush 10 mL (has no administration in time range)   heparin (porcine) injection 5,000 Units (has no administration in time range)   polyethylene glycol packet 17 g (has no administration in time range)   atorvastatin tablet 40 mg (has no administration in time range)   aspirin EC tablet 81 mg (has no administration in time range)   acetaminophen tablet 650 mg (has no administration in time range)     Medical Decision Making  89 y/o with acute onset left sided weakness with dysarthria.  CThead on arrival with acute intracranial hemorrhage. Pt taken to IR for possible intervention. Admit to neurocritical  care    Amount and/or Complexity of Data Reviewed  External Data Reviewed: labs and notes.    Risk  Decision regarding hospitalization.    Critical Care  Total time providing critical care: 10 minutes                               Clinical Impression:   Final diagnoses:  [I63.511] Acute ischemic right MCA stroke        ED Disposition Condition    Admit                 Samantha Braswell MD  09/03/23 0058       Samantha Braswell MD  12/12/23 0438

## 2023-09-03 NOTE — HPI
89 yo M with PMHx of AAA, AICD, and HTN presents as a transfer from [facility] for possible intervention. Patient presented to OSH with left sided weakness, facial droop and dysarthria. Reportedly his LKN was 1450. Patient got up to use the bathroom, fell and was found down by family who noticed confusion, LSW and facial droop. At baseline is highly functional and performs all of his ADLs independently. Telestroke with Dr. Delvalle, patient with improving symptoms so no thrombolytic was recommended. Imaging concerning for R MCA occlusion for which patient was [place] [facility] [place]. On arrival to [facility], patient with left facial droop but no current left sided weakness. Daughter at the bedside reports his symptoms have significantly improved and besides the current left facial droop, he is at his baseline speech and moving everything as normal.

## 2023-09-03 NOTE — HOSPITAL COURSE
09/03/2023: Patient was taken to IR with no thrombectomy done.  Patient doing well after procedure, repeat CT head done which was stable. Holding ASA for 7-10 days after procedure.  09/04/2023: FAYE. MRI pending once AICD/cardiac device reps coordinated. Stable for SD to stroke team.

## 2023-09-03 NOTE — HOSPITAL COURSE
9/3/2023 Patient was taken to IR yesterday, no successful intervention TICI0 of R M2. CTH post IR with R MCA frontal opercular infarct, possible SAH vs contrast and new R temporal IPH. Exam notable for R gaze(crosses midline), LUE/LLE drift, dysarthria, L hemihypoesthesia and L neglect. Recommend holding ASA in setting of possible new IPH. Will continue to follow.   9/4/2023 Neuro exam stable. Working with therapy. Recs for IPR. Daughter requesting spacing out of neurochecks and vital checks. Patient is ok for SD.   9/5/2023 mild dysarthria, left sided hemiparesis, left neglect and no drift.  Neuro exam stable. ECHO was done with EF 55-60%, and LA severely dilated.  Pt has a pacemaker which incompatible with MRI.  Plan to repeat CT head today.  09/06/2023 NAEO, neuro exam stable. Repeat CTH yesterday stable. Dispo recs for IPR and placement pending, anticipate discharge possibly today or tomorrow.

## 2023-09-03 NOTE — PLAN OF CARE
Problem: Occupational Therapy  Goal: Occupational Therapy Goal  Description: Goals to be met by: 9/17/23     Patient will increase functional independence with ADLs by performing:    Grooming while EOB with Set-up Assistance.  Rolling to Left with Minimal Assistance.   Supine to sit with Minimal Assistance.  Stand pivot transfers with Contact Guard Assistance.  Step transfer with Contact Guard Assistance    Outcome: Ongoing, Progressing   Pts goals are set.

## 2023-09-03 NOTE — ASSESSMENT & PLAN NOTE
-Right MCA CVA s/p IR without thrombectomy.   -Initial NIH 4 on telestroke and improving. NIH in ED of 1 per VN team, 5 per IR. Taken for IR, no succesfull clot removal  - NIH on arrival to unit  8. CTH stat. Family agrees exam stable now compared to improvement at OSH.   -Unable to view OSH imaging.   -Admit to NCC post IR   -Vascular Neurology following, appreciate recs   -Asa and plavix given at OSH.   -Hourly neuro checks and vital signs  -EKG, Echo, and CXR pending  -A1c, TSH, and lipid panel pending  -aPTT, PT/INR pending  -CBC, CMP, Mag, and phos daily  -SBP goal < 160  -PRN labetalol and hydralazine  -Statin   -SCD; subQ heparin DVt ppx   -CTA at OSH reportedly revaeled concerns for R M2 LVO despite low NIH. (Unable to view OSH images)   -MRI brain ordered to evaluation stroke burden. Pending evaluation of MRI compatibility of AICd.   -PT/OT/SLP          Antithrombotics for secondary stroke prevention: Antiplatelets: Aspirin: 81 mg daily  Clopidogrel: 75 mg daily    Statins for secondary stroke prevention and hyperlipidemia, if present:   Statins: Atorvastatin- 40 mg daily    Aggressive risk factor modification: HTN, AAA     Rehab efforts: The patient has been evaluated by a stroke team provider and the therapy needs have been fully considered based off the presenting complaints and exam findings. The following therapy evaluations are needed: PT evaluate and treat, OT evaluate and treat, SLP evaluate and treat    Diagnostics ordered/pending: HgbA1C to assess blood glucose levels, Lipid Profile to assess cholesterol levels, MRI head without contrast to assess brain parenchyma, TTE to assess cardiac function/status , TSH to assess thyroid function    VTE prophylaxis: Heparin 5000 units SQ every 8 hours    BP parameters: Post unsuccesful thrombectomy, <160 for concerns of possible contrast extravasation

## 2023-09-03 NOTE — SUBJECTIVE & OBJECTIVE
No past medical history on file.  No past surgical history on file.   No current facility-administered medications on file prior to encounter.     No current outpatient medications on file prior to encounter.      Allergies: Patient has no known allergies.    No family history on file.     Review of Systems  Objective:     Vitals:    Temp: 97.8 °F (36.6 °C)  Pulse: 70  BP: 112/71  MAP (mmHg): 87  Resp: (!) 22  SpO2: (!) 91 %    Temp  Min: 97.6 °F (36.4 °C)  Max: 97.8 °F (36.6 °C)  Pulse  Min: 70  Max: 93  BP  Min: 107/68  Max: 181/101  MAP (mmHg)  Min: 82  Max: 87  Resp  Min: 12  Max: 22  SpO2  Min: 91 %  Max: 95 %    No intake/output data recorded.   Unmeasured Output  Urine Occurrence: 1        Physical Exam    Constitutional: Well-nourished, well-developed. Appears stated age.   No obvious distress. Patient lying in bed comfortably. Daughters at bedside.  Brace on R fore arm.     Eyes: Clear conjunctiva. Anicteric. No discharge.   Lids without lesions. R gaze.     HEENT: Normocephalic, atraumatic.   Nose and external ears atraumatic.   Mucus membranes are moist.     Cardio: Regular rate and rhythm on telemetry monitor. Pacer spikes visible.   Pulses intact in bilateral upper and lower extremities.     Respiratory: Regular effort, no respiratory distress.  Minimal rhonchi throughout lung fields, improved w/ cough.     GI:Soft, non-distended, non-tender.  No appreciable hepatosplenomegaly.     Skin: No erythema, rash, or wounds to exposed skin.     Neuro: E4 V5 M6    Awake, alert, and oriented to self, time, place, and situation. Patient is answering all questions appropriately and following all commands briskly. Left  facial droop. R gaze, able to come to midline, but not pass midline. PERRL.     Motor:   BAUGH spontaneously and against gravity   RUE: 5/5  LUE: 4/5,  RLE: 5/5  LLE: 3/5, drift, doesn't hit bed    Sensory:  Sensation grossly intact over Right side, diminished over Left side.        09/02/23 5140   [facility]  Stroke Scale   1a. Level of Consciousness 0-->Alert, keenly responsive   1b. LOC Questions 1-->Answers one question correctly   1c. LOC Commands 0-->Performs both tasks correctly   2. Best Gaze 1-->Partial gaze palsy, gaze is abnormal in one or both eyes, but forced deviation or total gaze paresis is not present   3. Visual 1-->Partial hemianopia   4. Facial Palsy 1-->Minor paralysis (flattened nasolabial fold, asymmetry on smiling)   5a. Motor Arm, Left 0-->No drift, limb holds 90 (or 45) degrees for full 10 secs   5b. Motor Arm, Right 0-->No drift, limb holds 90 (or 45) degrees for full 10 secs   6a. Motor Leg, Left 1-->Drift, leg falls by the end of the 5-sec period but does not hit bed   6b. Motor Leg, Right 0-->No drift, leg holds 30 degree position for full 5 secs   7. Limb Ataxia 0-->Absent   8. Sensory 1-->Mild-to-moderate sensory loss, patient feels pinprick is less sharp or is dull on the affected side, or there is a loss of superficial pain with pinprick, but patient is aware of being touched   9. Best Language 0-->No aphasia, normal   10. Dysarthria 1-->Mild-to-moderate dysarthria, patient slurs at least some words and, at worst, can be understood with some difficulty   11. Extinction and Inattention (formerly Neglect) 1-->Visual, tactile, auditory, spatial, or personal inattention or extinction to bilateral simultaneous stimulation in one of the sensory modalities   Total (NIH Stroke Scale) 8       Today I personally reviewed pertinent medications, lines/drains/airways, imaging, laboratory results, notably: CTH, medications, CBC, CMP, NIH

## 2023-09-03 NOTE — H&P
[facility] - Neuro Critical Care  Neurocritical Care  History & Physical    Admit Date: 9/2/2023  Service Date: 09/02/2023  Length of Stay: 0    Subjective:     Chief Complaint: Stroke due to embolism of right middle cerebral artery    History of Present Illness: Mr. Ryder is a 89 yo m w/ pmhx of AAA, AICD, and HTN who presents as transfer from [facility] for possible Neuro IR intervention of new onset L  weakness, L facial droop, and dysarthria.     Patient was LKN approx 1450 9/2  when he got up to use the bathroom and fell, family was outside and heard the fall. They reported that he was confused, not saying he fell, but leaned forward, was dizzy, and sat down. They also noted LSW, slurred speech, and facial droop. He was brought to OSH where Telestroke was started w/ Dr. Delvalle. He was noted at that time to have a documented NIH of 4 w/ improvement of symptoms (specifically LSW). Decision was made not to give thrombolytics, but [place] [facility] [place] possible IR.     In ED, per chart review patient was NIH 1 per VN team. Patient was taken back for IR. No successful thrombectomy. Reported minimal possible contrast extravasation, but not able to repeat extravasation.     Admit to [facility] post IR.     Of note: at baseline patient is highly functional and performs all of his ADLs independently.       No past medical history on file.  No past surgical history on file.   No current facility-administered medications on file prior to encounter.     No current outpatient medications on file prior to encounter.      Allergies: Patient has no known allergies.    No family history on file.     Review of Systems  Objective:     Vitals:    Temp: 97.8 °F (36.6 °C)  Pulse: 70  BP: 112/71  MAP (mmHg): 87  Resp: (!) 22  SpO2: (!) 91 %    Temp  Min: 97.6 °F (36.4 °C)  Max: 97.8 °F (36.6 °C)  Pulse  Min: 70  Max: 93  BP  Min: 107/68  Max: 181/101  MAP (mmHg)  Min: 82  Max: 87  Resp  Min: 12  Max: 22  SpO2  Min: 91  %  Max: 95 %    No intake/output data recorded.   Unmeasured Output  Urine Occurrence: 1        Physical Exam    Constitutional: Well-nourished, well-developed. Appears stated age.   No obvious distress. Patient lying in bed comfortably. Daughters at bedside.  Brace on R fore arm.     Eyes: Clear conjunctiva. Anicteric. No discharge.   Lids without lesions. R gaze.     HEENT: Normocephalic, atraumatic.   Nose and external ears atraumatic.   Mucus membranes are moist.     Cardio: Regular rate and rhythm on telemetry monitor. Pacer spikes visible.   Pulses intact in bilateral upper and lower extremities.     Respiratory: Regular effort, no respiratory distress.  Minimal rhonchi throughout lung fields, improved w/ cough.     GI:Soft, non-distended, non-tender.  No appreciable hepatosplenomegaly.     Skin: No erythema, rash, or wounds to exposed skin.     Neuro: E4 V5 M6    Awake, alert, and oriented to self, time, place, and situation. Patient is answering all questions appropriately and following all commands briskly. Left  facial droop. R gaze, able to come to midline, but not pass midline. PERRL.     Motor:   BAUGH spontaneously and against gravity   RUE: 5/5  LUE: 4/5,  RLE: 5/5  LLE: 3/5, drift, doesn't hit bed    Sensory:  Sensation grossly intact over Right side, diminished over Left side.        09/02/23 2309   NIH Stroke Scale   1a. Level of Consciousness 0-->Alert, keenly responsive   1b. LOC Questions 1-->Answers one question correctly   1c. LOC Commands 0-->Performs both tasks correctly   2. Best Gaze 1-->Partial gaze palsy, gaze is abnormal in one or both eyes, but forced deviation or total gaze paresis is not present   3. Visual 1-->Partial hemianopia   4. Facial Palsy 1-->Minor paralysis (flattened nasolabial fold, asymmetry on smiling)   5a. Motor Arm, Left 0-->No drift, limb holds 90 (or 45) degrees for full 10 secs   5b. Motor Arm, Right 0-->No drift, limb holds 90 (or 45) degrees for full 10 secs   6a.  Motor Leg, Left 1-->Drift, leg falls by the end of the 5-sec period but does not hit bed   6b. Motor Leg, Right 0-->No drift, leg holds 30 degree position for full 5 secs   7. Limb Ataxia 0-->Absent   8. Sensory 1-->Mild-to-moderate sensory loss, patient feels pinprick is less sharp or is dull on the affected side, or there is a loss of superficial pain with pinprick, but patient is aware of being touched   9. Best Language 0-->No aphasia, normal   10. Dysarthria 1-->Mild-to-moderate dysarthria, patient slurs at least some words and, at worst, can be understood with some difficulty   11. Extinction and Inattention (formerly Neglect) 1-->Visual, tactile, auditory, spatial, or personal inattention or extinction to bilateral simultaneous stimulation in one of the sensory modalities   Total (NIH Stroke Scale) 8       Today I personally reviewed pertinent medications, lines/drains/airways, imaging, laboratory results, notably: CTH, medications, CBC, CMP, NIH     Assessment/Plan:     Neuro  * Stroke due to embolism of right middle cerebral artery  -Right MCA CVA s/p IR without thrombectomy.   -Initial NIH 4 on telestroke and improving. NIH in ED of 1 per VN team, 5 per IR. Taken for IR, no succesfull clot removal  - NIH on arrival to unit  8. CTH stat. Family agrees exam stable now compared to improvement at OSH.   -Unable to view OSH imaging.   -Admit to NCC post IR   -Vascular Neurology following, appreciate recs   -Asa and plavix given at OSH.   -Hourly neuro checks and vital signs  -EKG, Echo, and CXR pending  -A1c, TSH, and lipid panel pending  -aPTT, PT/INR pending  -CBC, CMP, Mag, and phos daily  -SBP goal < 160  -PRN labetalol and hydralazine  -Statin   -SCD; subQ heparin DVt ppx   -CTA at OSH reportedly revaeled concerns for R M2 LVO despite low NIH. (Unable to view OSH images)   -MRI brain ordered to evaluation stroke burden. Pending evaluation of MRI compatibility of AICd.   -PT/OT/SLP          Antithrombotics  for secondary stroke prevention: Antiplatelets: Aspirin: 81 mg daily  Clopidogrel: 75 mg daily    Statins for secondary stroke prevention and hyperlipidemia, if present:   Statins: Atorvastatin- 40 mg daily    Aggressive risk factor modification: HTN, AAA     Rehab efforts: The patient has been evaluated by a stroke team provider and the therapy needs have been fully considered based off the presenting complaints and exam findings. The following therapy evaluations are needed: PT evaluate and treat, OT evaluate and treat, SLP evaluate and treat    Diagnostics ordered/pending: HgbA1C to assess blood glucose levels, Lipid Profile to assess cholesterol levels, MRI head without contrast to assess brain parenchyma, TTE to assess cardiac function/status , TSH to assess thyroid function    VTE prophylaxis: Heparin 5000 units SQ every 8 hours    BP parameters: Post unsuccesful thrombectomy, <160 for concerns of possible contrast extravasation         Cytotoxic brain edema  Secondary to primary problem  -Follow hourly neurochecks        Left-sided weakness  -PT/OT     Cardiac/Vascular  Abdominal aortic aneurysm (AAA) without rupture  History of     AICD (automatic cardioverter/defibrillator) present  Daughters provided AICD card. Submitted to radiology and in chart to see if MRI compatible    Primary hypertension  -SBP goal < 160 in setting of  CVA s/p IR   -PRN labetalol, hydralazine  -restart home antihypertensives as appropriate   -EKG and Echo             The patient is being Prophylaxed for:  Venous Thromboembolism with: Mechanical or Chemical  Stress Ulcer with: Not Applicable   Ventilator Pneumonia with: not applicable    Activity Orders          Progressive Mobility Protocol (mobilize patient to their highest level of functioning at least twice daily) starting at 09/03 0800    Turn patient starting at 09/03 0000    Elevate HOB starting at 09/02 2210    Diet NPO: NPO starting at 09/02 2210        DNR - awaiting update  in chart.     Critical condition in that Patient has a condition that poses threat to life and bodily function: R MCA CVA s/p IR no TNK or thrombectomy, cytotoxic cerebral edema, HTN, AAA     55 minutes of Critical care time was spent personally by me on the following activities: development of treatment plan with patient or surrogate and bedside caregivers, discussions with consultants, evaluation of patient's response to treatment, examination of patient, ordering and performing treatments and interventions, ordering and review of laboratory studies, ordering and review of radiographic studies, pulse oximetry, antibiotic titration if applicable, vasopressor titration if applicable, re-evaluation of patient's condition. This critical care time did not overlap with that of any other provider or involve time for any procedures. There is high probability for acute neurological change leading to clinical and possibly life-threatening deterioration requiring highest level of physician preparedness for urgent intervention.      Sushma Kyle PA-C  Neurocritical Care  [facility] - Neuro Critical Care

## 2023-09-03 NOTE — HPI
Mr. Ryder is a 89 yo m w/ pmhx of AAA, AICD, and HTN who presents as transfer from [facility] for possible Neuro IR intervention of new onset L  weakness, L facial droop, and dysarthria.     Patient was LKN approx 1450 9/2  when he got up to use the bathroom and fell, family was outside and heard the fall. They reported that he was confused, not saying he fell, but leaned forward, was dizzy, and sat down. They also noted LSW, slurred speech, and facial droop. He was brought to OSH where Telestroke was started w/ Dr. Delvalle. He was noted at that time to have a documented NIH of 4 w/ improvement of symptoms (specifically LSW). Decision was made not to give thrombolytics, but transfer to [facility] for evaluation for possible IR.     In ED, per chart review patient was NIH 1 per VN team. Patient was taken back for IR. No successful thrombectomy. Reported minimal possible contrast extravasation, but not able to repeat extravasation.     Admit to NCC post IR.     Of note: at baseline patient is highly functional and performs all of his ADLs independently.

## 2023-09-03 NOTE — PLAN OF CARE
Bedside swallow assessment completed. Recommend mechanical soft diet with thin liquids. Order requested from team. ST to f/u to ensure tolerance and to complete cognitive-linguistic evaluation. Adele Santiago CCC-SLP 9/3/2023 12:55 PM

## 2023-09-03 NOTE — CARE UPDATE
Updated by MRI tech Pt requiring pacemaker rep for MRI. Tech will notify rep in AM. NCC on call made aware

## 2023-09-03 NOTE — ASSESSMENT & PLAN NOTE
-SBP goal < 160 in setting of  CVA s/p IR   -PRN labetalol, hydralazine  -restart home antihypertensives as appropriate   -EKG and Echo

## 2023-09-03 NOTE — CONSULTS
Inpatient consult to Physical Medicine Rehab  Consult performed by: Adelaida Trinh NP  Consult ordered by: Sushma Kyle PA-C      Consult received.  Reviewed patient history and current admission.  PM&R following. Will follow up with pt once medically stable and able to participate with therapies.    Adelaida Trinh NP  Physical Medicine & Rehabilitation   09/03/2023

## 2023-09-03 NOTE — PLAN OF CARE
PT evaluation complete - see note for details. POC and goals established.    Problem: Physical Therapy  Goal: Physical Therapy Goal  Description: Goals to be met by: 23     Patient will increase functional independence with mobility by performin. Supine to sit with Contact Guard Assistance  2. Sit to supine with Contact Guard Assistance  3. Sit to stand transfer with Contact Guard Assistance  4. Bed to chair transfer with Contact Guard Assistance using LRAD  5. Gait  x 100 feet with Contact Guard Assistance using LRAD.     Outcome: Ongoing, Progressing     9/3/2023

## 2023-09-03 NOTE — PROGRESS NOTES
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 Ryder is a 89 yo m w/ pmhx of AAA, AICD, and HTN who presents as transfer from [facility] for possible Neuro IR intervention of new onset L  weakness, L facial droop, and dysarthria.     Patient was LKN approx 1450 9/2  when he got up to use the bathroom and fell, family was outside and heard the fall. They reported that he was confused, not saying he fell, but leaned forward, was dizzy, and sat down. They also noted LSW, slurred speech, and facial droop. He was brought to OSH where Telestroke was started w/ Dr. Delvalle. He was noted at that time to have a documented NIH of 4 w/ improvement of symptoms (specifically LSW). Decision was made not to give thrombolytics, but transfer to [facility] for evaluation for possible IR.     In ED, per chart review patient was NIH 1 per VN team. Patient was taken back for IR. No successful thrombectomy. Reported minimal possible contrast extravasation, but not able to repeat extravasation.     Admit to [facility] post IR.     Of note: at baseline patient is highly functional and performs all of his ADLs independently.       Hospital Course: Patient was taken to IR with no thrombectomy done.  Patient doing well after procedure, repeat CT head done which was stable. Holding ASA for 7-10 days after procedure.      Neurologic Chief Complaint:  R M2 TICI0    Subjective:     Interval History: Patient is seen for follow-up neurological assessment and treatment recommendations:     Patient was taken to IR yesterday, no successful intervention TICI0 of R M2. CTH post IR with R MCA frontal opercular infarct, possible SAH vs contrast and new R temporal IPH. Exam notable for R gaze(crosses midline), LUE/LLE drift, dysarthria, L  hemihypoesthesia and L neglect. Recommend holding ASA in setting of possible new IPH. Will continue to follow.     HPI, Past Medical, Family, and Social History remains the same as documented in the initial encounter.     Review of Systems   HENT:  Positive for hearing loss (at baseline).    Neurological:  Positive for speech difficulty, weakness and numbness.     Scheduled Meds:   aspirin  81 mg Oral Daily    atorvastatin  40 mg Oral Daily    heparin (porcine)  5,000 Units Subcutaneous Q8H    polyethylene glycol  17 g Oral Daily     Continuous Infusions:   niCARdipine       PRN Meds:acetaminophen, sodium chloride 0.9%, sodium chloride 0.9%    Objective:     Vital Signs (Most Recent):  Temp: 98.2 °F (36.8 °C) (09/03/23 1102)  Pulse: 74 (09/03/23 1402)  Resp: 17 (09/03/23 1402)  BP: 131/74 (09/03/23 1402)  SpO2: 95 % (09/03/23 1402)  BP Location: Left arm    Vital Signs Range (Last 24H):  Temp:  [97.6 °F (36.4 °C)-98.2 °F (36.8 °C)]   Pulse:  [69-93]   Resp:  [12-31]   BP: ()/()   SpO2:  [91 %-95 %]   BP Location: Left arm       Physical Exam  Vitals and nursing note reviewed.   Constitutional:       General: He is not in acute distress.  HENT:      Head: Normocephalic and atraumatic.      Right Ear: External ear normal.      Left Ear: External ear normal.   Eyes:      Comments: R gaze, crosses midline   Cardiovascular:      Rate and Rhythm: Normal rate.   Pulmonary:      Effort: Pulmonary effort is normal. No respiratory distress.   Abdominal:      General: There is no distension.   Skin:     General: Skin is warm and dry.   Neurological:      Mental Status: He is alert.      Cranial Nerves: Cranial nerve deficit present.      Sensory: Sensory deficit (L) present.      Motor: Weakness (L) and pronator drift present.              Neurological Exam:   LOC: alert  Attention Span: Good   Language: No aphasia  Articulation: Dysarthria  Orientation: oriented to person and place  EOM (CN III, IV, VI): R gaze,  "crosses midline  Facial Movement (CN VII): Lower facial weakness on the Left  Motor: Left hemiparesis with drift, R spontaneous  Tone: Normal tone throughout    Laboratory:  CMP:   Recent Labs   Lab 09/03/23 0027   CALCIUM 8.9   ALBUMIN 3.6   PROT 6.4      K 4.2   CO2 22*      BUN 22   CREATININE 1.1   ALKPHOS 109   ALT 16   AST 42*   BILITOT 0.6       BMP:   Recent Labs   Lab 09/03/23 0027      K 4.2      CO2 22*   BUN 22   CREATININE 1.1   CALCIUM 8.9       CBC:   Recent Labs   Lab 09/03/23 0027   WBC 9.19  9.19   RBC 4.07*  4.07*   HGB 13.3*  13.3*   HCT 39.4*  39.4*     153   MCV 97  97   MCH 32.7*  32.7*   MCHC 33.8  33.8       Lipid Panel:   Recent Labs   Lab 09/03/23 0027   CHOL 162   LDLCALC 101.6   HDL 40   TRIG 102       Coagulation:   Recent Labs   Lab 09/03/23 0027   INR 1.0   APTT 31.6       Platelet Aggregation Study: No results for input(s): "PLTAGG", "PLTAGINTERP", "PLTAGREGLACO", "ADPPLTAGGREG" in the last 168 hours.  Hgb A1C:   Recent Labs   Lab 09/03/23 0027   HGBA1C 5.4       TSH:   Recent Labs   Lab 09/03/23 0027   TSH 0.273*         Diagnostic Results     Brain/Vessel Imaging   CT WO 9/3/2023 1218  Stable appearance of the brain with recent right MCA infarct.  Stable intracranial hemorrhage.  No midline shift.    CT WO 9/3/2023 0805  Right MCA frontal opercular infarct.  Prominent subarachnoid hemorrhage/contrast.  New right temporal lobe intraparenchymal hemorrhage with trace intraventricular hemorrhage.  No significant midline shift    CT WO 9/2/2023  Moderate subarachnoid hemorrhage in the sylvian fissure and in the gyri of the temporoparietal lobe on the right.     Residual contrast within the aktvvb-lf-Vyclce vessels from previous angiogram.     No evidence of intraparenchymal or interventricular hemorrhage.     No evidence of cortical infarct.    IR angio 9/2/2023  1.    Right M2 LVO s/p direct catheter aspiration x4 & mechanical " thrombectomy x1 (TICI 0)  2.    Otherwise normal cerebral angiogram.    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   -Initial NIH 4 on telestroke and improving. NIH in ED of 1 per VN team, 5 per IR. Taken for IR, no succesfull clot removal  - NIH on arrival to unit  8. CTH stat. Family agrees exam stable now compared to improvement at OSH.   -Hourly neuro checks and vital signs  - Repeat CT head stable  -EKG, Echo, and CXR pending  -CBC, CMP, Mag, and phos daily  -SBP goal < 140 24 hours after procedure  -PRN labetalol and hydralazine  -Statin   -SCD; subQ heparin DVt ppx   -MRI brain ordered to evaluation stroke burden. Pending evaluation of MRI compatibility of AICd.   -PT/OT/SLP      Cytotoxic brain edema  Secondary to primary problem  -Follow hourly neurochecks        Cardiac/Vascular  AICD (automatic cardioverter/defibrillator) present  Daughters provided AICD card. Submitted to radiology and in chart to see if MRI compatible, pending representative    Primary hypertension  -SBP goal < 160 in setting of  CVA s/p IR   -PRN labetalol, hydralazine  -restart home antihypertensives as appropriate   -EKG and Echo             The patient is being Prophylaxed for:  Venous Thromboembolism with: Chemical  Stress Ulcer with: H2B  Ventilator Pneumonia with: none    Activity Orders            Diet Adult Regular (IDDSI Level 7) Thin: Regular starting at 09/03 1402    Progressive Mobility Protocol (mobilize patient to their highest level of functioning at least twice daily) starting at 09/03 0800    Turn patient starting at 09/03 0000    Elevate HOB starting at 09/02 2210          Full Code    Jayesh Dumont MD  Neurocritical Care  [facility] - Neuro Critical Care

## 2023-09-03 NOTE — CONSULTS
HPI:   89M w/ PMH HTN, A-fib w/ pacer placement who presents from OSH w/ R M2 LVO. Patient was LKN @ 1450 when he developed dysarthria & L FD. CTA w/ R M2 LVO. No TNK given. NIHSS 5 on arrival. Transferred to [facility] [place] for IR intervention.      ROS:   10-point ROS otherwise stable      Objective:  GCS 15, AOx3;  CNi except L FD, dysarthria, no gaze;  FC x4, mild L hemiparesis    General: Awake, Alert, Oriented  Head: Non-traumatic, normocephalic  Eyes: Pupils equal, EOMi  Neck: Supple, normal ROM, no tenderness to palpation  CVS: Normal rate and rhythm, distal pulses present  Pulm: Symmetric expansion, no respiratory distress  GI: Abdomen nondistended, nontender  MSK: Moves all extremities without restriction, atraumatic  Skin: Dry, intact  Psych: Normal thought content and cognition      A&P:  89M w/ PMH HTN, A-fib w/ pacer placement who presents from OSH w/ R M2 LVO:    --Patient evaluated at bedside by Neuro-IR  --All labs and diagnostics reviewed  --CTA w/ LVO of the R M2 artery  --Patient to undergo emergent IR angio for possible intervention          -ASA 2/Mallampati 2  --Further reccomendations to follow procedure    Dispo: Per stroke/NCC teams

## 2023-09-03 NOTE — ASSESSMENT & PLAN NOTE
89 yo M with PMHx of AAA, AICD, and HTN presents as a transfer from [facility] for possible intervention. Patient presented to OSH with left sided weakness, facial droop and dysarthria. Reportedly his LKN was 1450. Patient got up to use the bathroom, fell and was found down by family who noticed confusion, LSW and facial droop. At baseline is highly functional and performs all of his ADLs independently. Telestroke with Dr. Delvalle, patient with improving symptoms so no thrombolytic was recommended. Imaging concerning for R MCA occlusion for which patient was transferred to [facility] for possible intervention.     -Patient brought to ED32 on arrival and taken to IR  -On arrival, his exam was significant for mild LFD. No drift was noted in upper or lower extremities and no dysarthric speech appreciated. Daughter at bedside confirms speech is normal. She also notes, patient with significant improvement moving the left side when previously was unable to do so. Per EMS crew, when they arrived to OSH ED, patient was noted to have only left facial droop and no other deficits.  -case discussed with stroke fellow and staff    Antithrombotics for secondary stroke prevention: Antiplatelets: Aspirin: 81 mg daily  Clopidogrel: 75 mg daily pending repeat imaging post IR    Statins for secondary stroke prevention and hyperlipidemia, if present:   Statins: Atorvastatin- 40 mg daily    Aggressive risk factor modification: HTN, AAA     Rehab efforts: The patient has been evaluated by a stroke team provider and the therapy needs have been fully considered based off the presenting complaints and exam findings. The following therapy evaluations are needed: PT evaluate and treat, OT evaluate and treat, SLP evaluate and treat, PM&R evaluate for appropriate placement    Diagnostics ordered/pending: CT scan of head without contrast to asses brain parenchyma, HgbA1C to assess blood glucose levels, Lipid Profile to assess cholesterol levels,  MRI head without contrast to assess brain parenchyma, TTE to assess cardiac function/status , TSH to assess thyroid function    VTE prophylaxis: Mechanical prophylaxis: Place SCDs    BP parameters: Infarct: Post sucessful thrombectomy, SBP <140  Post unsucessful thrombectomy, SBP <220

## 2023-09-03 NOTE — ASSESSMENT & PLAN NOTE
-Right MCA CVA s/p IR without thrombectomy.   -Initial NIH 4 on telestroke and improving. NIH in ED of 1 per VN team, 5 per IR. Taken for IR, no succesfull clot removal  - NIH on arrival to unit  8. CTH stat. Family agrees exam stable now compared to improvement at OSH.   -Hourly neuro checks and vital signs  - Repeat CT head stable  -EKG, Echo, and CXR pending  -CBC, CMP, Mag, and phos daily  -SBP goal < 140 24 hours after procedure  -PRN labetalol and hydralazine  -Statin   -SCD; subQ heparin DVt ppx   -MRI brain ordered to evaluation stroke burden. Pending evaluation of MRI compatibility of AICd.   -PT/OT/SLP

## 2023-09-03 NOTE — ED NOTES
Pt transported to IR with physicians and MIKHAIL Dodge. Vital signs stable, consents signed and witnessed. Rapid Response notified.

## 2023-09-03 NOTE — BRIEF OP NOTE
Procedural Date:  09/02/23    Attending:  Edwin Rincon MD    Fellow(s):  Primo Martínez MD    Preoperative Diagnoses:   Right M2 LVO     Postoperative Diagnosis:  Same; s/p direct catheter aspiration x4 & mechanical thrombectomy x1 (TICI 0)     Procedure:   1-vessel cerebral angiogram with direct catheter aspiration and mechanical thrombectomy     Written Informed Consent Obtained:   Yes; by patient     Specimen Removed:   None     Estimated Blood Loss:  Minimal     Brief Procedure report:   A 7 [place] sheath was placed into the right radial artery and a 0.81 BMX guide with 5 [place] Marcos diagnostic catheter was advanced into the subclavian artery to the right common and internal carotid arteries which were subselected and angiography of the brain was performed. A right M2 LVO was appreciated after which a combination of Zoom 45, 35 and velocity microcatheters were used to attempt both catheter aspiration and mechanical thrombectomy with 4x40 solitaire stentretriever system. No revascularization was possible after direct catheter aspiration x4 and mechanical thrombectomy x1. There is no other evidence of aneurysm, fistula, malformation, or significant stenoocclusive disease.  A right radial artery angiogram was performed, the sheath removed and hemostasis achieved via TR band closure device.  No hematoma was present at the time of hemostasis.   The patient tolerated the procedure well.     Preliminary Interpretation:   1.    Right M2 LVO s/p direct catheter aspiration x4 & mechanical thrombectomy x1 (TICI 0)  2.    Otherwise normal cerebral angiogram.     (Please see Imaging report for full details)    Disposition:  To NCC

## 2023-09-03 NOTE — PLAN OF CARE
"[facility] Care Plan    POC reviewed with London Ryder and family at 1400. Pt verbalized understanding. Questions and concerns addressed. No acute events today. Pt progressing toward goals. Will continue to monitor. See below and flowsheets for full assessment and VS info.       8am CT and 12 pm CT completed.  Bubble Study/ Echo completed  Evaluated by OT and PT            Is this a stroke patient? yes- Stroke booklet reviewed with patient, risk factors identified for patient and stroke booklet remains at bedside for ongoing education.     Neuro:  [place] Coma Scale  Best Eye Response: 4-->(E4) spontaneous  Best Motor Response: 6-->(M6) obeys commands  Best Verbal Response: 5-->(V5) oriented  [place] Coma Scale Score: 15  Assessment Qualifiers: patient not sedated/intubated  Pupil PERRLA: yes     24 hr Temp:  [97.6 °F (36.4 °C)-98.2 °F (36.8 °C)]     CV:   Rhythm: paced rhythm  BP goals:   SBP < 160  MAP > 65    Resp:           Plan: N/A    GI/:     Diet/Nutrition Received: mechanical/dental soft  Last Bowel Movement: 09/01/23  Voiding Characteristics: incontinence    Intake/Output Summary (Last 24 hours) at 9/3/2023 1444  Last data filed at 9/3/2023 1105  Gross per 24 hour   Intake 1140 ml   Output 890 ml   Net 250 ml     Unmeasured Output  Urine Occurrence: 1  Pad Count: 1    Labs/Accuchecks:  Recent Labs   Lab 09/03/23  0027   WBC 9.19  9.19   RBC 4.07*  4.07*   HGB 13.3*  13.3*   HCT 39.4*  39.4*     153      Recent Labs   Lab 09/03/23  0027      K 4.2   CO2 22*      BUN 22   CREATININE 1.1   ALKPHOS 109   ALT 16   AST 42*   BILITOT 0.6      Recent Labs   Lab 09/03/23  0027   INR 1.0   APTT 31.6    No results for input(s): "CPK", "CPKMB", "TROPONINI", "MB" in the last 168 hours.    Electrolytes: No replacement orders  Accuchecks: Q6H    Gtts:   niCARdipine         LDA/Wounds:  Lines/Drains/Airways       Drain  Duration             Male External Urinary Catheter 09/03/23 0132 Small <1 day "              Peripheral Intravenous Line  Duration                  Peripheral IV - Single Lumen 09/02/23 18 G Right Antecubital 1 day         Peripheral IV - Single Lumen 09/02/23 Anterior;Left Hand 1 day                  Wounds: Yes  Wound care consulted: Yes

## 2023-09-03 NOTE — SUBJECTIVE & OBJECTIVE
Past medical history: AAA, AICD, HTN  No pertinent past surgical history   No pertinent family history     Review of patient's allergies indicates:  No Known Allergies    Medications: I have reviewed the current medication administration record.    (Not in a hospital admission)      Review of Systems   Neurological:  Positive for facial asymmetry, speech difficulty and weakness.     Objective:     Vital Signs (Most Recent):  Temp: 97.6 °F (36.4 °C) (09/02/23 2021)  Pulse: 80 (09/02/23 2024)  Resp: 20 (09/02/23 2024)  BP: (!) 181/101 (09/02/23 2024)  SpO2: 95 % (09/02/23 2024)    Vital Signs Range (Last 24H):  Temp:  [97.6 °F (36.4 °C)]   Pulse:  [80-93]   Resp:  [12-20]   BP: (135-181)/()   SpO2:  [94 %-95 %]        Physical Exam  Vitals and nursing note reviewed.   Constitutional:       General: He is not in acute distress.  HENT:      Head: Normocephalic.      Ears:      Comments: Hard of hearing     Nose: Nose normal.   Eyes:      Conjunctiva/sclera: Conjunctivae normal.   Cardiovascular:      Rate and Rhythm: Normal rate.   Pulmonary:      Effort: Pulmonary effort is normal. No respiratory distress.   Abdominal:      General: There is no distension.   Skin:     General: Skin is warm and dry.   Neurological:      Cranial Nerves: Cranial nerve deficit present.      Comments: Currently no dysarthric speech or drift noted in upper or lower extremities. Daughter at bedside confirms his speech is at his baseline    +LFD   Psychiatric:         Behavior: Behavior normal.              Neurological Exam:   LOC: alert  Attention Span: Good   Language: No aphasia  Articulation: No dysarthria  Orientation: oriented to person and place  Visual Fields: Full  EOM (CN III, IV, VI): Full/intact  Facial Movement (CN VII): Lower facial weakness on the Left  Motor: JAN and AG, symmetric strength in upper extremities  Sensation: Intact to light touch  Tone: Normal tone throughout      Laboratory:  CMP: No results for  "input(s): "GLUCOSE", "CALCIUM", "ALBUMIN", "PROT", "NA", "K", "CO2", "CL", "BUN", "CREATININE", "ALKPHOS", "ALT", "AST", "BILITOT" in the last 24 hours.  CBC: No results for input(s): "WBC", "RBC", "HGB", "HCT", "PLT", "MCV", "MCH", "MCHC" in the last 168 hours.  Lipid Panel: No results for input(s): "CHOL", "LDLCALC", "HDL", "TRIG" in the last 168 hours.  Coagulation: No results for input(s): "PT", "INR", "APTT" in the last 168 hours.  Hgb A1C: No results for input(s): "HGBA1C" in the last 168 hours.  TSH: No results for input(s): "TSH" in the last 168 hours.    Diagnostic Results:      Brain/Vessel imaging:  CTA at OSH per paper read with R MCA occlusion      "

## 2023-09-03 NOTE — ANESTHESIA PREPROCEDURE EVALUATION
"                                    [facility]  Anesthesia Pre-Operative Evaluation         Patient Name: London Ryder  YOB: 1930  MRN: [id number]    SUBJECTIVE:     Pre-operative evaluation for * No procedures listed *     09/02/2023    London Ryder is a 89 y.o. male w/ a significant PMHx of for ischemic cardiomyopathy with an ejection fraction of less than 35%, left bundle branch block who underwent biventricular ICD placement in 2013, AAA, HTN, hypothyroidism presenting with left hemiparesis, left facial droop, and slurred speech. Transfer to [facility] [place] for IR intervention.     Patient now presents for the above procedure(s).        No echo in [facility] or Care Everywhere.    LDA: None documented.       Prev airway: None documented.    Drips: None documented.      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     No current facility-administered medications on file prior to encounter.       No past surgical history on file.    Social History     Socioeconomic History    Marital status:        OBJECTIVE:     Vital Signs Range (Last 24H):         Significant Labs:  No results found for: "WBC", "HGB", "HCT", "PLT", "CHOL", "TRIG", "HDL", "LDLDIRECT", "ALT", "AST", "NA", "K", "CL", "CREATININE", "BUN", "CO2", "TSH", "PSA", "INR", "GLUF", "HGBA1C", "MICROALBUR"    Diagnostic Studies: No relevant studies.    EKG:   No results found for this or any previous visit.    2D ECHO:  TTE:  No results found for this or any previous visit.    LENNIE:  No results found for this or any previous visit.    ASSESSMENT/PLAN:           Pre-op Assessment    I have reviewed the Patient Summary Reports.     I have reviewed the Nursing Notes. I " have reviewed the NPO Status.   I have reviewed the Medications.     Review of Systems  Anesthesia Hx:  Denies Family Hx of Anesthesia complications.   Denies Personal Hx of Anesthesia complications.   Hematology/Oncology:  Hematology Normal   Oncology Normal     EENT/Dental:EENT/Dental Normal   Cardiovascular:   Pacemaker Hypertension    Pulmonary:  Pulmonary Normal    Renal/:  Renal/ Normal     Hepatic/GI:  Hepatic/GI Normal    Musculoskeletal:  Musculoskeletal Normal    Neurological:   CVA    Endocrine:  Endocrine Normal    Dermatological:  Skin Normal    Psych:  Psychiatric Normal           Physical Exam  General: Well nourished, Cooperative, Alert and Oriented    Airway:  Mallampati: II   Mouth Opening: Normal  TM Distance: Normal  Tongue: Normal  Neck ROM: Normal ROM    Dental:  Intact, Partial Dentures        Anesthesia Plan  Type of Anesthesia, risks & benefits discussed:    Anesthesia Type: Gen ETT  Intra-op Monitoring Plan: Standard ASA Monitors  Post Op Pain Control Plan: multimodal analgesia and IV/PO Opioids PRN  Induction:  IV  Airway Plan: Video, Post-Induction  Informed Consent: Informed consent signed with the Patient and all parties understand the risks and agree with anesthesia plan.  All questions answered.   ASA Score: 4 Emergent  Day of Surgery Review of History & Physical: H&P Update referred to the surgeon/provider.    Ready For Surgery From Anesthesia Perspective.     .

## 2023-09-03 NOTE — PROGRESS NOTES
HPI:   89M w/ PMH HTN, A-fib w/ pacer placement who presents from OSH w/ R M2 LVO. Patient was LKN @ 1450 when he developed dysarthria & L FD. CTA w/ R M2 LVO. No TNK given. NIHSS 5 on arrival. Transferred to [facility] [place] IR intervention.        Interval:   9/3: AFVSS, CTH w/ some contrast extrav/hematoma & progression of AIS s/p angio for M2 LVO (TICI 0), Exam w/ some changes reflecting progression of stroke vs mild mass effect        Objective:  GCS 15, AOx3;  CNi except L FD, dysarthria, R gaze able to overcome midline w/ effort;  FC x4, mild L hemiparesis    Right wrist site C/D/I, no hematoma or evidence of distal ischemia        A&P:  89M w/ PMH HTN, A-fib w/ pacer placement who presents from OSH w/ R M2 LVO now s/p DSA + ADAPT/MT (TICI 0):    --Admitted to Neuro-ICU; Stroke/NCC primary      -q1h neuro-checks  --All labs and diagnostics reviewed      -CTA 9/2: R M2 LVO      -DSA 9/2: R M2 LVO s/p ADAPT/MT (TICI 0)      -CTH 9/3: Moderate amounts of parasylvian contrast extrav/hematoma  --Recommend stability imaging for CT results; defer all further imaging to NCC/Stroke services  --Wrist checks complete; catheter site c/d/i without evidence of hematoma  --Maintain SBP <140 for 24h post-intervention  --No further surgical/interventional management at this time  --Continue maximal medical therapy and stroke work-up per NCC/Stroke teams; stable for Anti-plt/coag medications per primary team  --Neuro-IR will sign-off; please call for any additional questions    Dispo: Per stroke/NCC teams

## 2023-09-03 NOTE — PROGRESS NOTES
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 Patient presented to OSH with left sided weakness, facial droop and dysarthria. Reportedly his LKN was 1450. Patient got up to use the bathroom, fell and was found down by family who noticed confusion, LSW and facial droop. At baseline is highly functional and performs all of his ADLs independently. Telestroke with Dr. Delvalle, patient with improving symptoms so no thrombolytic was recommended. Imaging concerning for R MCA occlusion for which patient was transferred to [facility] for possible intervention.     Patient was taken to IR yesterday, no successful intervention TICI0 of R M2. CTH post IR with R MCA frontal opercular infarct, possible SAH vs contrast and new R temporal IPH. Exam notable for R gaze(crosses midline), LUE/LLE drift, dysarthria, L hemihypoesthesia and L neglect. Recommend holding ASA in setting of possible new IPH. Will continue to follow.       Antithrombotics for secondary stroke prevention: Antiplatelets: None: Intracerebral Hemorrhage     Statins for secondary stroke prevention and hyperlipidemia, if present:   Statins: Atorvastatin- 40 mg daily    Aggressive risk factor modification: HTN, AAA     Rehab efforts: The patient has been evaluated by a stroke team provider and the therapy needs have been fully considered based off the presenting complaints and exam findings. The following therapy evaluations are needed: PT evaluate and treat, OT evaluate and treat, SLP evaluate and treat, PM&R evaluate for appropriate placement    Diagnostics ordered/pending: CT scan of head without contrast to asses brain parenchyma, MRI head without contrast to assess brain parenchyma, TTE to assess cardiac function/status     VTE  prophylaxis: Mechanical prophylaxis: Place SCDs    BP parameters:SBP<140 in the setting of possible new IPH        Intraparenchymal hemorrhage of brain  See principal problem  Likely new R temporal IPH      Primary hypertension  Stroke RF  SBP<140 in the setting of possible new IPH         9/3/2023 Patient was taken to IR yesterday, no successful intervention TICI0 of R M2. CTH post IR with R MCA frontal opercular infarct, possible SAH vs contrast and new R temporal IPH. Exam notable for R gaze(crosses midline), LUE/LLE drift, dysarthria, L hemihypoesthesia and L neglect. Recommend holding ASA in setting of possible new IPH. Will continue to follow.       STROKE DOCUMENTATION   Acute Stroke Times   Last Known Normal Date: 09/02/23  Last Known Normal Time: 1450  Symptom Onset Date: 09/02/23  Symptom Onset Time: 1450  Stroke Team Called Date: 09/02/23  Stroke Team Called Time: 2024  Stroke Team Arrival Date: 09/02/23  Stroke Team Arrival Time: 2025  CT Interpretation Time:  (done at OSH, none done on arrival)  Thrombolytic Therapy Recommended: No  Thrombectomy Recommended: Yes  Decision to Treat Time for IR:  (decision made by IR team prior to arrival around 1934)    NIH Scale:  1a. Level of Consciousness: 0-->Alert, keenly responsive  1b. LOC Questions: 0-->Answers both questions correctly  1c. LOC Commands: 0-->Performs both tasks correctly  2. Best Gaze: 1-->Partial gaze palsy, gaze is abnormal in one or both eyes, but forced deviation or total gaze paresis is not present  3. Visual: 0-->No visual loss  4. Facial Palsy: 1-->Minor paralysis (flattened nasolabial fold, asymmetry on smiling)  5a. Motor Arm, Left: 1-->Drift, limb holds 90 (or 45) degrees, but drifts down before full 10 seconds, does not hit bed or other support  5b. Motor Arm, Right: 0-->No drift, limb holds 90 (or 45) degrees for full 10 secs  6a. Motor Leg, Left: 1-->Drift, leg falls by the end of the 5-sec period but does not hit bed  6b. Motor Leg,  Right: 0-->No drift, leg holds 30 degree position for full 5 secs  7. Limb Ataxia: 0-->Absent  8. Sensory: 1-->Mild-to-moderate sensory loss, patient feels pinprick is less sharp or is dull on the affected side, or there is a loss of superficial pain with pinprick, but patient is aware of being touched  9. Best Language: 0-->No aphasia, normal  10. Dysarthria: 1-->Mild-to-moderate dysarthria, patient slurs at least some words and, at worst, can be understood with some difficulty  11. Extinction and Inattention (formerly Neglect): 1-->Visual, tactile, auditory, spatial, or personal inattention or extinction to bilateral simultaneous stimulation in one of the sensory modalities  Total (NIH Stroke Scale): 7       Modified Soquel Score: 0  Raghavendra Coma Scale:    ABCD2 Score:    PLHE2PZ3-CPG Score:   HAS -BLED Score:   ICH Score:   Hunt & Danielle Classification:      Hemorrhagic change of an Ischemic Stroke: Does this patient have an ischemic stroke with hemorrhagic changes? No     Neurologic Chief Complaint:  R M2 TICI0    Subjective:     Interval History: Patient is seen for follow-up neurological assessment and treatment recommendations:     Patient was taken to IR yesterday, no successful intervention TICI0 of R M2. CTH post IR with R MCA frontal opercular infarct, possible SAH vs contrast and new R temporal IPH. Exam notable for R gaze(crosses midline), LUE/LLE drift, dysarthria, L hemihypoesthesia and L neglect. Recommend holding ASA in setting of possible new IPH. Will continue to follow.     HPI, Past Medical, Family, and Social History remains the same as documented in the initial encounter.     Review of Systems   HENT:  Positive for hearing loss (at baseline).    Neurological:  Positive for speech difficulty, weakness and numbness.     Scheduled Meds:   aspirin  81 mg Oral Daily    atorvastatin  40 mg Oral Daily    heparin (porcine)  5,000 Units Subcutaneous Q8H    polyethylene glycol  17 g Oral Daily      Continuous Infusions:   niCARdipine       PRN Meds:acetaminophen, sodium chloride 0.9%, sodium chloride 0.9%    Objective:     Vital Signs (Most Recent):  Temp: 98.2 °F (36.8 °C) (09/03/23 1102)  Pulse: 70 (09/03/23 1202)  Resp: 18 (09/03/23 1202)  BP: 109/68 (09/03/23 1202)  SpO2: 95 % (09/03/23 1202)  BP Location: Left arm    Vital Signs Range (Last 24H):  Temp:  [97.6 °F (36.4 °C)-98.2 °F (36.8 °C)]   Pulse:  [69-93]   Resp:  [12-31]   BP: ()/()   SpO2:  [91 %-95 %]   BP Location: Left arm       Physical Exam  Vitals and nursing note reviewed.   Constitutional:       General: He is not in acute distress.  HENT:      Head: Normocephalic and atraumatic.      Right Ear: External ear normal.      Left Ear: External ear normal.   Eyes:      Comments: R gaze, crosses midline   Cardiovascular:      Rate and Rhythm: Normal rate.   Pulmonary:      Effort: Pulmonary effort is normal. No respiratory distress.   Abdominal:      General: There is no distension.   Skin:     General: Skin is warm and dry.   Neurological:      Mental Status: He is alert.      Cranial Nerves: Cranial nerve deficit present.      Sensory: Sensory deficit (L) present.      Motor: Weakness (L) present.              Neurological Exam:   LOC: alert  Attention Span: Good   Language: No aphasia  Articulation: Dysarthria  Orientation: oriented to person and place  EOM (CN III, IV, VI): R gaze, crosses midline  Facial Movement (CN VII): Lower facial weakness on the Left  Motor: Left hemiparesis with drift, R spontaneous  Sensation: L hemihypoesthesia  L sided neglect  Tone: Normal tone throughout    Laboratory:  CMP:   Recent Labs   Lab 09/03/23  0027   CALCIUM 8.9   ALBUMIN 3.6   PROT 6.4      K 4.2   CO2 22*      BUN 22   CREATININE 1.1   ALKPHOS 109   ALT 16   AST 42*   BILITOT 0.6     BMP:   Recent Labs   Lab 09/03/23  0027      K 4.2      CO2 22*   BUN 22   CREATININE 1.1   CALCIUM 8.9     CBC:   Recent Labs  "  Lab 09/03/23 0027   WBC 9.19  9.19   RBC 4.07*  4.07*   HGB 13.3*  13.3*   HCT 39.4*  39.4*     153   MCV 97  97   MCH 32.7*  32.7*   MCHC 33.8  33.8     Lipid Panel:   Recent Labs   Lab 09/03/23 0027   CHOL 162   LDLCALC 101.6   HDL 40   TRIG 102     Coagulation:   Recent Labs   Lab 09/03/23 0027   INR 1.0   APTT 31.6     Platelet Aggregation Study: No results for input(s): "PLTAGG", "PLTAGINTERP", "PLTAGREGLACO", "ADPPLTAGGREG" in the last 168 hours.  Hgb A1C:   Recent Labs   Lab 09/03/23 0027   HGBA1C 5.4     TSH:   Recent Labs   Lab 09/03/23 0027   TSH 0.273*       Diagnostic Results     Brain/Vessel Imaging   [facility] WO 9/3/2023 1218  Stable appearance of the brain with recent right MCA infarct.  Stable intracranial hemorrhage.  No midline shift.    [facility] WO 9/3/2023 0805  Right MCA frontal opercular infarct.  Prominent subarachnoid hemorrhage/contrast.  New right temporal lobe intraparenchymal hemorrhage with trace intraventricular hemorrhage.  No significant midline shift    [facility] WO 9/2/2023  Moderate subarachnoid hemorrhage in the sylvian fissure and in the gyri of the temporoparietal lobe on the right.     Residual contrast within the [id number] vessels from previous angiogram.     No evidence of intraparenchymal or interventricular hemorrhage.     No evidence of cortical infarct.    IR angio 9/2/2023  1.    Right M2 LVO s/p direct catheter aspiration x4 & mechanical thrombectomy x1 (TICI 0)  2.    Otherwise normal cerebral angiogram.    CTA at OSH per paper read with R MCA occlusion    Cardiac Imaging   Echo pending      Matilde Mayo PA-C  Comprehensive Stroke Center  Department of Vascular Neurology   [facility] - Neuro Critical Care      "

## 2023-09-03 NOTE — SUBJECTIVE & OBJECTIVE
Neurologic Chief Complaint:  R M2 TICI0    Subjective:     Interval History: Patient is seen for follow-up neurological assessment and treatment recommendations:     Patient was taken to IR yesterday, no successful intervention TICI0 of R M2. CTH post IR with R MCA frontal opercular infarct, possible SAH vs contrast and new R temporal IPH. Exam notable for R gaze(crosses midline), LUE/LLE drift, dysarthria, L hemihypoesthesia and L neglect. Recommend holding ASA in setting of possible new IPH. Will continue to follow.     HPI, Past Medical, Family, and Social History remains the same as documented in the initial encounter.     Review of Systems   HENT:  Positive for hearing loss (at baseline).    Neurological:  Positive for speech difficulty, weakness and numbness.     Scheduled Meds:   aspirin  81 mg Oral Daily    atorvastatin  40 mg Oral Daily    heparin (porcine)  5,000 Units Subcutaneous Q8H    polyethylene glycol  17 g Oral Daily     Continuous Infusions:   niCARdipine       PRN Meds:acetaminophen, sodium chloride 0.9%, sodium chloride 0.9%    Objective:     Vital Signs (Most Recent):  Temp: 98.2 °F (36.8 °C) (09/03/23 1102)  Pulse: 74 (09/03/23 1402)  Resp: 17 (09/03/23 1402)  BP: 131/74 (09/03/23 1402)  SpO2: 95 % (09/03/23 1402)  BP Location: Left arm    Vital Signs Range (Last 24H):  Temp:  [97.6 °F (36.4 °C)-98.2 °F (36.8 °C)]   Pulse:  [69-93]   Resp:  [12-31]   BP: ()/()   SpO2:  [91 %-95 %]   BP Location: Left arm       Physical Exam  Vitals and nursing note reviewed.   Constitutional:       General: He is not in acute distress.  HENT:      Head: Normocephalic and atraumatic.      Right Ear: External ear normal.      Left Ear: External ear normal.   Eyes:      Comments: R gaze, crosses midline   Cardiovascular:      Rate and Rhythm: Normal rate.   Pulmonary:      Effort: Pulmonary effort is normal. No respiratory distress.   Abdominal:      General: There is no distension.   Skin:      "General: Skin is warm and dry.   Neurological:      Mental Status: He is alert.      Cranial Nerves: Cranial nerve deficit present.      Sensory: Sensory deficit (L) present.      Motor: Weakness (L) and pronator drift present.              Neurological Exam:   LOC: alert  Attention Span: Good   Language: No aphasia  Articulation: Dysarthria  Orientation: oriented to person and place  EOM (CN III, IV, VI): R gaze, crosses midline  Facial Movement (CN VII): Lower facial weakness on the Left  Motor: Left hemiparesis with drift, R spontaneous  Tone: Normal tone throughout    Laboratory:  CMP:   Recent Labs   Lab 09/03/23 0027   CALCIUM 8.9   ALBUMIN 3.6   PROT 6.4      K 4.2   CO2 22*      BUN 22   CREATININE 1.1   ALKPHOS 109   ALT 16   AST 42*   BILITOT 0.6       BMP:   Recent Labs   Lab 09/03/23 0027      K 4.2      CO2 22*   BUN 22   CREATININE 1.1   CALCIUM 8.9       CBC:   Recent Labs   Lab 09/03/23 0027   WBC 9.19  9.19   RBC 4.07*  4.07*   HGB 13.3*  13.3*   HCT 39.4*  39.4*     153   MCV 97  97   MCH 32.7*  32.7*   MCHC 33.8  33.8       Lipid Panel:   Recent Labs   Lab 09/03/23 0027   CHOL 162   LDLCALC 101.6   HDL 40   TRIG 102       Coagulation:   Recent Labs   Lab 09/03/23 0027   INR 1.0   APTT 31.6       Platelet Aggregation Study: No results for input(s): "PLTAGG", "PLTAGINTERP", "PLTAGREGLACO", "ADPPLTAGGREG" in the last 168 hours.  Hgb A1C:   Recent Labs   Lab 09/03/23 0027   HGBA1C 5.4       TSH:   Recent Labs   Lab 09/03/23 0027   TSH 0.273*         Diagnostic Results     Brain/Vessel Imaging   CT WO 9/3/2023 1218  Stable appearance of the brain with recent right MCA infarct.  Stable intracranial hemorrhage.  No midline shift.    [facility] WO 9/3/2023 0805  Right MCA frontal opercular infarct.  Prominent subarachnoid hemorrhage/contrast.  New right temporal lobe intraparenchymal hemorrhage with trace intraventricular hemorrhage.  No significant midline " shift    [facility] WO 9/2/2023  Moderate subarachnoid hemorrhage in the sylvian fissure and in the gyri of the temporoparietal lobe on the right.     Residual contrast within the [id number] vessels from previous angiogram.     No evidence of intraparenchymal or interventricular hemorrhage.     No evidence of cortical infarct.    IR angio 9/2/2023  1.    Right M2 LVO s/p direct catheter aspiration x4 & mechanical thrombectomy x1 (TICI 0)  2.    Otherwise normal cerebral angiogram.    CTA at OSH per paper read with R MCA occlusion    Cardiac Imaging   Echo pending

## 2023-09-03 NOTE — PT/OT/SLP EVAL
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    Recommendations:                 General Recommendations:  Dysphagia therapy, Speech language evaluation, and Cognitive-linguistic evaluation  Diet recommendations:  Soft & Bite Sized Diet - IDDSI Level 6, Thin liquids - IDDSI Level 0   Aspiration Precautions: 1 bite/sip at a time, Check for pocketing/oral residue, Eliminate distractions, Feed only when awake/alert, HOB to 90 degrees, Meds whole 1 at a time, Monitor for s/s of aspiration, Remain upright 30 minutes post meal, Small bites/sips, and Standard aspiration precautions   General Precautions: Standard, aspiration, fall  Communication strategies:  provide increased time to answer, go to room if call light pushed, and very hard of hearing    Assessment:     London Ryder is a 89 y.o. male with an SLP diagnosis of Dysphagia.      History:     No past medical history on file.    No past surgical history on file.    Prior Intubation HX:  9/2 for procedure    Modified Barium Swallow: none on file    Chest X-Rays: 9/2-  The trachea is unremarkable.  There are calcifications of the aortic knob.  The cardiomediastinal silhouette is borderline enlarged.  There is prominence of the of the hilar vessels.  There are no pleural effusions.  There is no evidence of a pneumothorax.  There is no evidence of pneumomediastinum.  There are scattered areas of bibasilar subsegmental atelectasis.  There is no focal consolidation.  There are degenerative changes in the osseous structures.    Prior diet: regular/thin.      Subjective     Spoke with RN prior to entering pt's room . Pt seen bedside for session. Alert and agreeable to ST. Family arrived midway through session.      Pain/Comfort:  Pain Rating 1: 0/10  Pain Rating Post-Intervention 1: 0/10    Respiratory Status: Room air    Objective:      Oral Musculature Evaluation  Oral Musculature: left weakness  Dentition: upper dentures, lower dentures  Secretion Management: adequate  Mucosal Quality: adequate  Volitional Cough: elicited  Volitional Swallow: elicited  Voice Prior to PO Intake: clear    Bedside Swallow Eval:   Consistencies Assessed:  Thin liquids via tsp, cup, and straw  Puree applesauce  Solids crackers      Oral Phase:   Prolonged mastication with regular solid, would benefit from softer textures    Pharyngeal Phase:   no overt clinical signs/symptoms of aspiration  no overt clinical signs/symptoms of pharyngeal dysphagia    Compensatory Strategies  None    Treatment: Provided education to pt and family re: role of ST,  POC, swallow precautions, recommendations for mechanical soft diet with thin liquids, and plan to f/u to complete cognitive-linguistic evaluation. They verbalized understanding. White board updated. RN and MD notified of results and recs.       Goals:   Multidisciplinary Problems       SLP Goals          Problem: SLP    Goal Priority Disciplines Outcome   SLP Goal     SLP    Description: Goals expected to be met by 9/10:  1. Pt will tolerate least restrictive diet without overt s/sx airway threat.   2. Pt will participate in cognitive-linguistic evaluation to further develop POC.                            Plan:     Patient to be seen:  4 x/week   Plan of Care expires:  10/02/23  Plan of Care reviewed with:  patient, daughter   SLP Follow-Up:  Yes       Discharge recommendations:  rehabilitation facility   Barriers to Discharge:  Level of Skilled Assistance Needed      Time Tracking:     SLP Treatment Date:   09/03/23  Speech Start Time:  0854  Speech Stop Time:  0910     Speech Total Time (min):  16 min    Billable Minutes: Eval Swallow and Oral Function 8 and Self Care/Home Management Training 8    09/03/2023

## 2023-09-03 NOTE — ANESTHESIA PROCEDURE NOTES
Intubation    Date/Time: 9/2/2023 8:49 PM    Performed by: Sushma Gupta CRNA  Authorized by: Mitra Moncada MD    Intubation:     Induction:  Intravenous    Intubated:  Postinduction    Mask Ventilation:  Easy mask    Attempts:  1    Attempted By:  CRNA    Method of Intubation:  Video laryngoscopy    Blade:  Leung 3    Laryngeal View Grade: Grade I - full view of cords      Difficult Airway Encountered?: No      Complications:  None    Airway Device:  Oral endotracheal tube    Airway Device Size:  7.5    Style/Cuff Inflation:  Cuffed (inflated to minimal occlusive pressure)    Tube secured:  24    Secured at:  The lips    Placement Verified By:  Capnometry    Complicating Factors:  None    Findings Post-Intubation:  BS equal bilateral and atraumatic/condition of teeth unchanged

## 2023-09-03 NOTE — CARE UPDATE
Patient arrived to [facility] from [facility] IR [id number]    Report received from: [facility]    Type of stroke/diagnosis:      Thrombectomy end time 2202     Current symptoms: drift left lower, left face droop     Skin Assessment done: Y  Wounds noted: none  *If wounds noted, was Wound Care consulted? Y/N  *If wounds noted, LDA placed? Y/N  Skin Assessment Verified by: Roz Chawla Completed? Yes    Patient Belongings on Admit: none    NCC notified: name of person notified Sushma LIN

## 2023-09-03 NOTE — PT/OT/SLP EVAL
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 [place] [facility] [place] with medical diagnosis of Stroke due to embolism of right middle cerebral artery. Pt presents with weakness, impaired endurance, impaired functional mobility, impaired self care skills, gait instability, impaired balance, decreased coordination, decreased lower extremity function, decreased safety awareness, impaired coordination. These deficits effect their roles and responsibilities in which they were able to complete prior to admit.     Pt is agreeable to therapy evaluation and session. Daughters at bedside. PTA, pt was (I) with ambulation and ADLs. Pt was very active and living independently. Pt demonstrates R side gaze preference throughout session. Pt able to sit eob and progress from Max to CGA while eob. Able to stand, take lateral steps, and transfer to recliner. Pt left in recliner with daughters in room. Pt would benefit from IPR as he was independent prior to admission and would benefit from intense therapy to regain functional mobility and strength. Will continue to assess mobility.    London Ryder would benefit from acute PT intervention to improve quality of life, focus on recovery of impairments, provide patient/caregiver education, reduce fall risk, and maximize (I) and safety with functional mobility. Once medically stable, recommending pt discharge to Inpatient Rehabilitation Facility .      Rehab  Prognosis: Good    Plan:     During this hospitalization, patient to be seen 4 x/week to address the identified rehab impairments via gait training, therapeutic exercises, therapeutic activities, neuromuscular re-education and progress towards stated goals.     Plan of Care Expires:  10/03/23  Plan of Care reviewed with: patient, daughter    This plan of care has been discussed with the patient/caregiver, who was included in its development and is in agreement with the identified goals and treatment plan.     Subjective     Communicated with RN prior to session.  Patient found supine upon PT entry to room, agreeable to evaluation. Pt's daughters present during session.    Chief Complaint: none stated    Patient/Family Comments/goals: return to PLOF    Pain/Comfort:  Pain Rating 1: 0/10  Pain Rating Post-Intervention 1: 0/10    Patients cultural, spiritual, Confucianist conflicts given the current situation: None identified     Patient History: information obtained from daughters     Living Environment: Pt lives alone in single level home  with 0 KARRIE. Bathroom set-up: walk-in shower and T/S  Prior Level of Function: independent with mobility and ADLs  DME owned: none  Drives: yes  Support Available/Caregiver Assistance:  daughters  Pt is able to d/c to daughters house for additional support if needed  Objective:   OT present for coeval due to pt's multiple medical comorbidities and functional/cognition deficits requiring two skilled therapists to appropriately progress pt's musculoskeletal strength, neuromuscular control, and endurance while taking into consideration medical acuity and pt safety.    Patient found with: rolon catheter, telemetry, pulse ox (continuous), blood pressure cuff    Recent Surgery: Procedure(s) (LRB):  ANGIOGRAM-CEREBRAL (N/A)    General Precautions: Standard, aspiration, fall   Orthopedic Precautions:    Braces:     Oxygen Device: room air      Exams:    Cognition:  Alert  Command following:  Follows multistep verbal commands  Communication: dysarthria  Ak Chin    Sensation:   Light touch sensation: Intact BLEs    Edema/Skin Integrity: None noted; Wound L hand    Postural examination/scapula alignment: Rounded shoulder and Head forward    Lower Extremity Range of Motion:  Right Lower Extremity: WFL  Left Lower Extremity: WFL    Lower Extremity Strength:  Right Lower Extremity:  grossly 3+/5  Left Lower Extremity:  grossly 3+/5    Functional Mobility:    Bed Mobility:  Scooting with Doris  Supine > Sit with moderate assistance x2    Transfers:   Sit <> Stand Transfer: Minimal Assistancex2 from eob with HHA  Bed <> Chair: Moderate Assistance with no AD              Gait:  Distance: 5 lateral steps left + 5 lateral steps R  Assistance level: Minimal Assistancex2  Assistive Device: none  Gait Assessment: decreased step length , decreased bobby, decreased gait speed, and narrow base of support    Balance:  Dynamic Sitting: Mod with progression to CGA  Standing:  Static: POOR+: Needs MINIMAL assist to maintain   Dynamic: POOR+: Needs MIN (minimal ) assist during gait    Outcome Measure: AM-PAC 6 CLICK MOBILITY  Total Score:16     Patient/Caregiver Education:     Therapist educated pt/caregiver regarding:   PT POC and goals for therapy   Safety with mobility and fall risk   Instruction on use of call button and importance of calling nursing staff for assistance with mobility   Time provided for therapeutic counseling and discussion of current health disposition. All questions answered to satisfaction, within scope of PT practice     Patient/caregiver able to verbalize understanding and expressed no further questions this visit; will follow-up with pt/caregiver during current admit for additional questions/concerns within scope of practice.     White board updated.     Patient left up in chair with all lines intact, call button in reach, and daughters present.    GOALS:   Multidisciplinary Problems       Physical  Therapy Goals          Problem: Physical Therapy    Goal Priority Disciplines Outcome Goal Variances Interventions   Physical Therapy Goal     PT, PT/OT Ongoing, Progressing     Description: Goals to be met by: 23     Patient will increase functional independence with mobility by performin. Supine to sit with Contact Guard Assistance  2. Sit to supine with Contact Guard Assistance  3. Sit to stand transfer with Contact Guard Assistance  4. Bed to chair transfer with Contact Guard Assistance using LRAD  5. Gait  x 100 feet with Contact Guard Assistance using LRAD.                            History:     No past medical history on file.    No past surgical history on file.    Time Tracking:     PT Received On: 23  PT Start Time: 1318     PT Stop Time: 1350  PT Total Time (min): 32 min     Billable Minutes: Evaluation 8, Gait Training 8, and Neuromuscular Re-education 16    2023

## 2023-09-04 LAB
ALBUMIN SERPL BCP-MCNC: 3.6 G/DL (ref 3.5–5.2)
ALP SERPL-CCNC: 88 U/L (ref 55–135)
ALT SERPL W/O P-5'-P-CCNC: 13 U/L (ref 10–44)
ANION GAP SERPL CALC-SCNC: 10 MMOL/L (ref 8–16)
AST SERPL-CCNC: 28 U/L (ref 10–40)
BASOPHILS # BLD AUTO: 0.02 K/UL (ref 0–0.2)
BASOPHILS NFR BLD: 0.3 % (ref 0–1.9)
BILIRUB SERPL-MCNC: 1 MG/DL (ref 0.1–1)
BUN SERPL-MCNC: 19 MG/DL (ref 10–30)
CALCIUM SERPL-MCNC: 9.4 MG/DL (ref 8.7–10.5)
CHLORIDE SERPL-SCNC: 104 MMOL/L (ref 95–110)
CO2 SERPL-SCNC: 23 MMOL/L (ref 23–29)
CREAT SERPL-MCNC: 1.1 MG/DL (ref 0.5–1.4)
DIFFERENTIAL METHOD: ABNORMAL
EOSINOPHIL # BLD AUTO: 0.1 K/UL (ref 0–0.5)
EOSINOPHIL NFR BLD: 2 % (ref 0–8)
ERYTHROCYTE [DISTWIDTH] IN BLOOD BY AUTOMATED COUNT: 13.4 % (ref 11.5–14.5)
EST. GFR  (NO RACE VARIABLE): >60 ML/MIN/1.73 M^2
GLUCOSE SERPL-MCNC: 94 MG/DL (ref 70–110)
HCT VFR BLD AUTO: 40.3 % (ref 40–54)
HGB BLD-MCNC: 13.2 G/DL (ref 14–18)
IMM GRANULOCYTES # BLD AUTO: 0.02 K/UL (ref 0–0.04)
IMM GRANULOCYTES NFR BLD AUTO: 0.3 % (ref 0–0.5)
LYMPHOCYTES # BLD AUTO: 1.1 K/UL (ref 1–4.8)
LYMPHOCYTES NFR BLD: 16.1 % (ref 18–48)
MAGNESIUM SERPL-MCNC: 2.1 MG/DL (ref 1.6–2.6)
MCH RBC QN AUTO: 31.9 PG (ref 27–31)
MCHC RBC AUTO-ENTMCNC: 32.8 G/DL (ref 32–36)
MCV RBC AUTO: 97 FL (ref 82–98)
MONOCYTES # BLD AUTO: 0.7 K/UL (ref 0.3–1)
MONOCYTES NFR BLD: 10.3 % (ref 4–15)
NEUTROPHILS # BLD AUTO: 4.7 K/UL (ref 1.8–7.7)
NEUTROPHILS NFR BLD: 71 % (ref 38–73)
NRBC BLD-RTO: 0 /100 WBC
PHOSPHATE SERPL-MCNC: 3.1 MG/DL (ref 2.7–4.5)
PLATELET # BLD AUTO: 167 K/UL (ref 150–450)
PMV BLD AUTO: 10.2 FL (ref 9.2–12.9)
POTASSIUM SERPL-SCNC: 4.5 MMOL/L (ref 3.5–5.1)
PROT SERPL-MCNC: 6.6 G/DL (ref 6–8.4)
RBC # BLD AUTO: 4.14 M/UL (ref 4.6–6.2)
SODIUM SERPL-SCNC: 137 MMOL/L (ref 136–145)
WBC # BLD AUTO: 6.6 K/UL (ref 3.9–12.7)

## 2023-09-04 PROCEDURE — 94761 N-INVAS EAR/PLS OXIMETRY MLT: CPT

## 2023-09-04 PROCEDURE — 11000001 HC ACUTE MED/SURG PRIVATE ROOM

## 2023-09-04 PROCEDURE — 99233 SBSQ HOSP IP/OBS HIGH 50: CPT | Mod: FS,,, | Performed by: NURSE PRACTITIONER

## 2023-09-04 PROCEDURE — 80053 COMPREHEN METABOLIC PANEL: CPT

## 2023-09-04 PROCEDURE — 83735 ASSAY OF MAGNESIUM: CPT

## 2023-09-04 PROCEDURE — 97116 GAIT TRAINING THERAPY: CPT

## 2023-09-04 PROCEDURE — 97535 SELF CARE MNGMENT TRAINING: CPT

## 2023-09-04 PROCEDURE — 99233 SBSQ HOSP IP/OBS HIGH 50: CPT | Mod: ,,, | Performed by: PSYCHIATRY & NEUROLOGY

## 2023-09-04 PROCEDURE — 97530 THERAPEUTIC ACTIVITIES: CPT

## 2023-09-04 PROCEDURE — 51798 US URINE CAPACITY MEASURE: CPT

## 2023-09-04 PROCEDURE — 99233 PR SUBSEQUENT HOSPITAL CARE,LEVL III: ICD-10-PCS | Mod: FS,,, | Performed by: NURSE PRACTITIONER

## 2023-09-04 PROCEDURE — 99233 PR SUBSEQUENT HOSPITAL CARE,LEVL III: ICD-10-PCS | Mod: ,,, | Performed by: PSYCHIATRY & NEUROLOGY

## 2023-09-04 PROCEDURE — 51701 INSERT BLADDER CATHETER: CPT

## 2023-09-04 PROCEDURE — 85025 COMPLETE CBC W/AUTO DIFF WBC: CPT

## 2023-09-04 PROCEDURE — 97112 NEUROMUSCULAR REEDUCATION: CPT

## 2023-09-04 PROCEDURE — 84100 ASSAY OF PHOSPHORUS: CPT

## 2023-09-04 RX ORDER — ALBUTEROL SULFATE 2.5 MG/.5ML
2.5 SOLUTION RESPIRATORY (INHALATION) EVERY 6 HOURS PRN
Status: DISCONTINUED | OUTPATIENT
Start: 2023-09-04 | End: 2023-09-06 | Stop reason: HOSPADM

## 2023-09-04 RX ORDER — AMOXICILLIN 250 MG
1 CAPSULE ORAL 2 TIMES DAILY
Status: DISCONTINUED | OUTPATIENT
Start: 2023-09-04 | End: 2023-09-04

## 2023-09-04 NOTE — PLAN OF CARE
CM received call from pt's dgtr Viviane - requesting referrals to the [place] area. Dgtr provided 2 choices:    [facility] [place]  [facility]    CM forwarded clinical info to both facilities via [facility]. Viviane stated that another family will be visiting facilities tmw and available for any needed paperwork. Family is requesting to transfer as soon as possible - goal is to limit pt's time in hospital. CM discussed therapy recs for rehab but closest rehabs are in [place] and family prefers pt to be in [place]. M-F NPU case mgmt team will f/u tmw.    BECK ParkerN, RN, [facility]  Transitional Care Manager  [phone]  [email or web address]    [facility] - Neurosurgery (Hospital)  Discharge Reassessment    Primary Care Provider: No, Primary Doctor    Expected Discharge Date:     Reassessment (most recent)       Discharge Reassessment - 09/04/23 1650          Discharge Reassessment    Assessment Type Discharge Planning Reassessment     Did the patient's condition or plan change since previous assessment? No     Discharge Plan discussed with: Patient;Adult children     Discharge Plan A Skilled Nursing Facility     Discharge Plan B Home with family;Home Health     Transition of Care Barriers None     Why the patient remains in the hospital Requires continued medical care        Post-Acute Status    Post-Acute Authorization Placement     Post-Acute Placement Status Referrals Sent     Discharge Delays None known at this time

## 2023-09-04 NOTE — ACP (ADVANCE CARE PLANNING)
Advance Care Planning     Date: 09/02/2023    Code Status  In light of the patients advanced and life limiting illness,I engaged the the patient and family in a voluntary conversation about the patient's preferences for care  at the very end of life. Daughters, Lesa and Ginger, voluntarily voiced that if the patient were to decline at all, he has expressed before and filed DNR previously. They volunteered to bring in documentation as soon as they were able to travel home and get the paperwork.     The patient wishes to have a natural, peaceful death.  Along those lines, the patient does not wish to have CPR or other invasive treatments performed when his heart and/or breathing stops. I communicated to the patient and family that a DNR order would be placed in his medical record to reflect this preference.  I spent a total of 35 minutes engaging the patient in this advance care planning discussion.

## 2023-09-04 NOTE — ASSESSMENT & PLAN NOTE
Right MCA CVA s/p IR without thrombectomy, no TNK  Initial NIH 4 on telestroke and improving. NIH in ED of 1 per VN team, 5 per IR. Taken for IR, no succesfull clot removal  NIH on arrival to unit  8. CTH stat. Family agrees exam stable now compared to improvement at OSH.   Hemorrhagic conversion of stroke on post IR imaging    - Neuro checks/VS q4hrs  - Interval CT head stable  - CBC, CMP, Mag, and phos daily  - SBP goal < 140  - PRN labetalol and hydralazine  - Statin  - VTE ppx: SCDs  - SQH held due to hemorrhagic conversion of stroke  - MRI brain ordered to evaluation stroke burden. Pending evaluation of MRI compatibility of AICD   - PT/OT/SLP  - Level 6 diet  - Stable to SD to VN

## 2023-09-04 NOTE — SUBJECTIVE & OBJECTIVE
Review of Systems: Review of Systems   Respiratory:  Negative for cough, sputum production and shortness of breath.    Cardiovascular:  Negative for chest pain and palpitations.   Gastrointestinal:  Negative for heartburn, nausea and vomiting.   Musculoskeletal:  Negative for back pain, joint pain and neck pain.   Neurological:  Positive for sensory change and focal weakness. Negative for headaches.     Vitals:   Temp: 98.1 °F (36.7 °C)  Pulse: 96  Rhythm: normal sinus rhythm  BP: 119/80  MAP (mmHg): 112  Resp: (!) 27  SpO2: (!) 76 %    Temp  Min: 97.8 °F (36.6 °C)  Max: 98.3 °F (36.8 °C)  Pulse  Min: 69  Max: 96  BP  Min: 107/68  Max: 156/79  MAP (mmHg)  Min: 83  Max: 114  Resp  Min: 12  Max: 31  SpO2  Min: 76 %  Max: 98 %    09/03 0701 - 09/04 0700  In: 480 [P.O.:480]  Out: 1190 [Urine:1190]   Unmeasured Output  Urine Occurrence: 1  Pad Count: 1     Examination:   Constitutional: Well-developed. No apparent distress.   Eyes: Conjunctiva clear, anicteric. Lids no lesions.  Head/Ears/Nose/Mouth/Throat/Neck: Moist mucous membranes. External ears, nose atraumatic.   Cardiovascular: Regular rhythm. No leg edema.  Respiratory: Comfortable respirations. Clear to auscultation.  Gastrointestinal: Soft, nondistended, nontender. + bowel sounds.    Neurologic:  -GCS E 4 V 5 M 6  -Alert. Oriented to person, place, and time. Mild dysarthria. Follows commands.  -Cranial nerves: R gaze preference, but able to cross midline, L visual field cut, PERRL, L facial droop, + cough  -Motor: L hemiparesis- able to lift LUE 4/5 strength, LLE 3/5, RUE/RLE 5/5 strength  -Sensation: Intact to light touch to R side, diminished sensation to L side    Medications:   ContinuousniCARdipine    Scheduledatorvastatin, 40 mg, Daily  famotidine, 20 mg, Daily  heparin (porcine), 5,000 Units, Q8H  polyethylene glycol, 17 g, Daily  senna-docusate 8.6-50 mg, 1 tablet, BID    PRNacetaminophen, 650 mg, Q6H PRN  sodium chloride 0.9%, 10 mL, PRN  sodium  "chloride 0.9%, 10 mL, PRN       Today I independently reviewed pertinent medications, lines/drains/airways, imaging, cardiology results, laboratory results, microbiology results, notably:     ISTAT: No results for input(s): "PH", "PCO2", "PO2", "POCSATURATED", "HCO3", "BE", "POCNA", "POCK", "POCTCO2", "POCGLU", "POCICA", "POCLAC", "SAMPLE" in the last 24 hours.   Chem:   Recent Labs   Lab 09/04/23  0037      K 4.5      CO2 23   GLU 94   BUN 19   CREATININE 1.1   CALCIUM 9.4   MG 2.1   PHOS 3.1   ANIONGAP 10   PROT 6.6   ALBUMIN 3.6   BILITOT 1.0   ALKPHOS 88   AST 28   ALT 13     Heme:   Recent Labs   Lab 09/04/23 0037   WBC 6.60   HGB 13.2*   HCT 40.3        Endo:   Recent Labs   Lab 09/03/23  1315 09/03/23  1839   POCTGLUCOSE 99 95          "

## 2023-09-04 NOTE — NURSING
Nurses Note -- 4 Eyes      9/4/2023   5:30 PM      Skin assessed during: Transfer      [x] No Altered Skin Integrity Present    []Prevention Measures Documented      [] Yes- Altered Skin Integrity Present or Discovered   [] LDA Added if Not in Epic (Describe Wound)   [] New Altered Skin Integrity was Present on Admit and Documented in LDA   [] Wound Image Taken    Wound Care Consulted? No    Attending Nurse:  Carola Hendrcikson RN/Staff Member:   Emma

## 2023-09-04 NOTE — PROGRESS NOTES
[facility] - Neuro Critical Care  Vascular Neurology  Comprehensive Stroke Center  Progress Note    Assessment/Plan:     * Stroke due to embolism of right middle cerebral artery  89 yo M with PMHx of AAA, AICD, and HTN presents as a transfer from [facility] for possible intervention. Patient presented to OSH with left sided weakness, facial droop and dysarthria. Reportedly his LKN was 1450. Patient got up to use the bathroom, fell and was found down by family who noticed confusion, LSW and facial droop. At baseline is highly functional and performs all of his ADLs independently. Telestroke with Dr. Delvalle, patient with improving symptoms so no thrombolytic was recommended. Imaging concerning for R MCA occlusion for which [place] [facility] [place]. Patient taken to IR, no successful intervention TICI0 of R M2. CTH post IR with R MCA frontal opercular infarct, possible SAH vs contrast and new R temporal IPH.     Neuro exam stable. Working with therapy. Recs for IPR. Daughter requesting spacing out of neurochecks and vital checks. Patient is ok for SD. MRI pending once AICD/cardiac device reps are coordinated.       Antithrombotics for secondary stroke prevention: Antiplatelets: None: Intracerebral Hemorrhage     Statins for secondary stroke prevention and hyperlipidemia, if present:   Statins: Atorvastatin- 40 mg daily    Aggressive risk factor modification: HTN, AAA     Rehab efforts: The patient has been evaluated by a stroke team provider and the therapy needs have been fully considered based off the presenting complaints and exam findings. The following therapy evaluations are needed: PT evaluate and treat, OT evaluate and treat, SLP evaluate and treat, PM&R evaluate for appropriate placement    Diagnostics ordered/pending: CT scan of head without contrast to asses brain parenchyma, MRI head without contrast to assess brain parenchyma, TTE to assess cardiac function/status      VTE prophylaxis: Mechanical prophylaxis: Place SCDs    BP parameters:SBP<160        Intraparenchymal hemorrhage of brain  See principal problem   R temporal IPH      AICD (automatic cardioverter/defibrillator) present  Pending coordination with reps for patient to be able to go for MRI    Primary hypertension  Stroke RF  SBP<160    Left-sided weakness  PT/OT-recs for IPR         9/3/2023 Patient was taken to IR yesterday, no successful intervention TICI0 of R M2. CTH post IR with R MCA frontal opercular infarct, possible SAH vs contrast and new R temporal IPH. Exam notable for R gaze(crosses midline), LUE/LLE drift, dysarthria, L hemihypoesthesia and L neglect. Recommend holding ASA in setting of possible new IPH. Will continue to follow.   9/4/2023 Neuro exam stable. Working with therapy. Recs for IPR. Daughter requesting spacing out of neurochecks and vital checks. Patient is ok for SD.       STROKE DOCUMENTATION   Acute Stroke Times   Last Known Normal Date: 09/02/23  Last Known Normal Time: 1450  Symptom Onset Date: 09/02/23  Symptom Onset Time: 1450  Stroke Team Called Date: 09/02/23  Stroke Team Called Time: 2024  Stroke Team Arrival Date: 09/02/23  Stroke Team Arrival Time: 2025  CT Interpretation Time:  (done at OSH, none done on arrival)  Thrombolytic Therapy Recommended: No  Thrombectomy Recommended: Yes  Decision to Treat Time for IR:  (decision made by IR team prior to arrival around 1934)    NIH Scale:  1a. Level of Consciousness: 0-->Alert, keenly responsive  1b. LOC Questions: 0-->Answers both questions correctly  1c. LOC Commands: 0-->Performs both tasks correctly  2. Best Gaze: 1-->Partial gaze palsy, gaze is abnormal in one or both eyes, but forced deviation or total gaze paresis is not present  3. Visual: 1-->Partial hemianopia  4. Facial Palsy: 1-->Minor paralysis (flattened nasolabial fold, asymmetry on smiling)  5a. Motor Arm, Left: 1-->Drift, limb holds 90 (or 45) degrees, but drifts down  before full 10 seconds, does not hit bed or other support  5b. Motor Arm, Right: 0-->No drift, limb holds 90 (or 45) degrees for full 10 secs  6a. Motor Leg, Left: 1-->Drift, leg falls by the end of the 5-sec period but does not hit bed  6b. Motor Leg, Right: 0-->No drift, leg holds 30 degree position for full 5 secs  7. Limb Ataxia: 0-->Absent  8. Sensory: 1-->Mild-to-moderate sensory loss, patient feels pinprick is less sharp or is dull on the affected side, or there is a loss of superficial pain with pinprick, but patient is aware of being touched  9. Best Language: 0-->No aphasia, normal  10. Dysarthria: 1-->Mild-to-moderate dysarthria, patient slurs at least some words and, at worst, can be understood with some difficulty  11. Extinction and Inattention (formerly Neglect): 1-->Visual, tactile, auditory, spatial, or personal inattention or extinction to bilateral simultaneous stimulation in one of the sensory modalities  Total (NIH Stroke Scale): 8       Modified Wayzata Score: 0  Raghavendra Coma Scale:    ABCD2 Score:    [id number] Score:   HAS -BLED Score:   ICH Score:   Hunt & Danielle Classification:      Hemorrhagic change of an Ischemic Stroke: Does this patient have an ischemic stroke with hemorrhagic changes? No     Neurologic Chief Complaint:  R M2 TICI0    Subjective:     Interval History: Patient is seen for follow-up neurological assessment and treatment recommendations:     Neuro exam stable. Working with therapy. Recs for IPR. Daughter requesting spacing out of neurochecks and vital checks. Patient is ok for SD. MRI pending once AICD/cardiac device reps are coordinated.     HPI, Past Medical, Family, and Social History remains the same as documented in the initial encounter.     Review of Systems   HENT:  Positive for hearing loss (at baseline).    Neurological:  Positive for speech difficulty, weakness and numbness.     Scheduled Meds:   atorvastatin  40 mg Oral Daily    famotidine  20 mg Oral Daily      Continuous Infusions:      PRN Meds:acetaminophen, albuterol sulfate, sodium chloride 0.9%, sodium chloride 0.9%    Objective:     Vital Signs (Most Recent):  Temp: 98.2 °F (36.8 °C) (09/04/23 1105)  Pulse: 85 (09/04/23 1305)  Resp: 20 (09/04/23 1305)  BP: 123/75 (09/04/23 1305)  SpO2: (!) 92 % (09/04/23 1305)  BP Location: Left arm    Vital Signs Range (Last 24H):  Temp:  [97.8 °F (36.6 °C)-98.3 °F (36.8 °C)]   Pulse:  [69-96]   Resp:  [12-36]   BP: (116-156)/(68-88)   SpO2:  [76 %-98 %]   BP Location: Left arm       Physical Exam  Vitals and nursing note reviewed.   Constitutional:       General: He is not in acute distress.  HENT:      Head: Normocephalic and atraumatic.      Right Ear: External ear normal.      Left Ear: External ear normal.   Eyes:      Comments: R gaze, crosses midline  Appears to have a left visual cut   Cardiovascular:      Rate and Rhythm: Normal rate.   Pulmonary:      Effort: Pulmonary effort is normal. No respiratory distress.   Abdominal:      General: There is no distension.   Skin:     General: Skin is warm and dry.   Neurological:      Mental Status: He is alert.      Cranial Nerves: Cranial nerve deficit present.      Sensory: Sensory deficit (L) present.      Motor: Weakness (L) present.              Neurological Exam:   LOC: alert  Attention Span: Good   Language: No aphasia  Articulation: Dysarthria  EOM (CN III, IV, VI): R gaze, crosses midline  Facial Movement (CN VII): Lower facial weakness on the Left  Motor: Left hemiparesis with drift, R spontaneous  Sensation: L hemihypoesthesia  L sided neglect  Tone: Normal tone throughout    Laboratory:  CMP:   Recent Labs   Lab 09/04/23  0037   CALCIUM 9.4   ALBUMIN 3.6   PROT 6.6      K 4.5   CO2 23      BUN 19   CREATININE 1.1   ALKPHOS 88   ALT 13   AST 28   BILITOT 1.0       BMP:   Recent Labs   Lab 09/04/23  0037      K 4.5      CO2 23   BUN 19   CREATININE 1.1   CALCIUM 9.4       CBC:   Recent Labs  "  Lab 09/04/23 0037   WBC 6.60   RBC 4.14*   HGB 13.2*   HCT 40.3      MCV 97   MCH 31.9*   MCHC 32.8       Lipid Panel:   Recent Labs   Lab 09/03/23 0027   CHOL 162   LDLCALC 101.6   HDL 40   TRIG 102       Coagulation:   Recent Labs   Lab 09/03/23 0027   INR 1.0   APTT 31.6       Platelet Aggregation Study: No results for input(s): "PLTAGG", "PLTAGINTERP", "PLTAGREGLACO", "ADPPLTAGGREG" in the last 168 hours.  Hgb A1C:   Recent Labs   Lab 09/03/23 0027   HGBA1C 5.4       TSH:   Recent Labs   Lab 09/03/23 0027   TSH 0.273*         Diagnostic Results     Brain/Vessel Imaging   CT WO 9/3/2023 1218  Stable appearance of the brain with recent right MCA infarct.  Stable intracranial hemorrhage.  No midline shift.    CTH WO 9/3/2023 0805  Right MCA frontal opercular infarct.  Prominent subarachnoid hemorrhage/contrast.  New right temporal lobe intraparenchymal hemorrhage with trace intraventricular hemorrhage.  No significant midline shift    CT WO 9/2/2023  Moderate subarachnoid hemorrhage in the sylvian fissure and in the gyri of the temporoparietal lobe on the right.     Residual contrast within the ziblho-ie-Hanysd vessels from previous angiogram.     No evidence of intraparenchymal or interventricular hemorrhage.     No evidence of cortical infarct.    IR angio 9/2/2023  1.    Right M2 LVO s/p direct catheter aspiration x4 & mechanical thrombectomy x1 (TICI 0)  2.    Otherwise normal cerebral angiogram.    CTA at OSH per paper read with R MCA occlusion    Cardiac Imaging   Echo 9/3/2023    Left Ventricle: The left ventricle is normal in size. Increased wall thickness. There is concentric remodeling. Normal wall motion. There is normal systolic function with a visually estimated ejection fraction of 55 - 60%. There is indeterminate diastolic function.    Right Ventricle: Normal right ventricular cavity size. Right ventricle wall motion  is normal. Systolic function is normal.    Left Atrium: Left " atrium is severely dilated. Agitated saline study of the atrial septum is negative, suggesting absence of intracardiac shunt at the atrial level.    Aortic Valve: There is moderate aortic valve sclerosis. Moderately restricted motion. There is mild stenosis. Aortic valve area by VTI is 2.07 cm². Aortic valve peak velocity is 2.13 m/s. Mean gradient is 12 mmHg. The dimensionless index is 0.35. There is mild aortic regurgitation.  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 There is mild to moderate regurgitation with multiple jets.    Pulmonary Artery: The estimated pulmonary artery systolic pressure is 35 mmHg.    IVC/SVC: Intermediate venous pressure at 8 mmHg.    Aorta: Aortic root is mildly dilated measuring 4.06 cm. Ascending aorta is mildly dilated measuring 4.29 cm.      Matilde Mayo PA-C  [facility]  Department of Vascular Neurology   [facility] - Neuro Critical Care

## 2023-09-04 NOTE — NURSING
Family insisted on taking patient off monitor, wanted to remove all LDA's, and Q4 neurochecks.      Notified Jenn, Charge Nurse and MEL Irving.

## 2023-09-04 NOTE — PROGRESS NOTES
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 Ryder is a 89 yo m w/ pmhx of AAA, AICD, and HTN who presents as transfer from [facility] for possible Neuro IR intervention of new onset L  weakness, L facial droop, and dysarthria.     Patient was LKN approx 1450 9/2  when he got up to use the bathroom and fell, family was outside and heard the fall. They reported that he was confused, not saying he fell, but leaned forward, was dizzy, and sat down. They also noted LSW, slurred speech, and facial droop. He was brought to OSH where Telestroke was started w/ Dr. Delvalle. He was noted at that time to have a documented NIH of 4 w/ improvement of symptoms (specifically LSW). Decision was made not to give thrombolytics, but transfer to [facility] [place] for evaluation for possible IR.     In ED, per chart review patient was NIH 1 per VN team. Patient was taken back for IR. No successful thrombectomy. Reported minimal possible contrast extravasation, but not able to repeat extravasation.     Admit to NCC post IR.     Of note: at baseline patient is highly functional and performs all of his ADLs independently.     Hospital Course: 09/03/2023: Patient was taken to IR with no thrombectomy done.  Patient doing well after procedure, repeat CT head done which was stable. Holding ASA for 7-10 days after procedure.  09/04/2023: NAEON. MRI pending once AICD/cardiac device reps coordinated. Stable for SD to stroke team.    Review of Systems: Review of Systems   Respiratory:  Negative for cough, sputum production and shortness of breath.    Cardiovascular:  Negative for chest pain and palpitations.   Gastrointestinal:  Negative for heartburn, nausea and vomiting.   Musculoskeletal:  Negative for back pain, joint pain and neck  "pain.   Neurological:  Positive for sensory change and focal weakness. Negative for headaches.     Vitals:   Temp: 98.1 °F (36.7 °C)  Pulse: 96  Rhythm: normal sinus rhythm  BP: 119/80  MAP (mmHg): 112  Resp: (!) 27  SpO2: (!) 76 %    Temp  Min: 97.8 °F (36.6 °C)  Max: 98.3 °F (36.8 °C)  Pulse  Min: 69  Max: 96  BP  Min: 107/68  Max: 156/79  MAP (mmHg)  Min: 83  Max: 114  Resp  Min: 12  Max: 31  SpO2  Min: 76 %  Max: 98 %    09/03 0701 - 09/04 0700  In: 480 [P.O.:480]  Out: 1190 [Urine:1190]   Unmeasured Output  Urine Occurrence: 1  Pad Count: 1     Examination:   Constitutional: Well-developed. No apparent distress.   Eyes: Conjunctiva clear, anicteric. Lids no lesions.  Head/Ears/Nose/Mouth/Throat/Neck: Moist mucous membranes. External ears, nose atraumatic.   Cardiovascular: Regular rhythm. No leg edema.  Respiratory: Comfortable respirations. Clear to auscultation.  Gastrointestinal: Soft, nondistended, nontender. + bowel sounds.    Neurologic:  -GCS E 4 V 5 M 6  -Alert. Oriented to person, place, and time. Mild dysarthria. Follows commands.  -Cranial nerves: R gaze preference, but able to cross midline, L visual field cut, PERRL, L facial droop, + cough  -Motor: L hemiparesis- able to lift LUE 4/5 strength, LLE 3/5, RUE/RLE 5/5 strength  -Sensation: Intact to light touch to R side, diminished sensation to L side    Medications:   ContinuousniCARdipine    Scheduledatorvastatin, 40 mg, Daily  famotidine, 20 mg, Daily  heparin (porcine), 5,000 Units, Q8H  polyethylene glycol, 17 g, Daily  senna-docusate 8.6-50 mg, 1 tablet, BID    PRNacetaminophen, 650 mg, Q6H PRN  sodium chloride 0.9%, 10 mL, PRN  sodium chloride 0.9%, 10 mL, PRN       Today I independently reviewed pertinent medications, lines/drains/airways, imaging, cardiology results, laboratory results, microbiology results, notably:     ISTAT: No results for input(s): "PH", "PCO2", "PO2", "POCSATURATED", "HCO3", "BE", "POCNA", "POCK", "POCTCO2", "POCGLU", " ""POCICA", "POCLAC", "SAMPLE" in the last 24 hours.   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 NIH in ED of 1 per VN team, 5 per IR. Taken for IR, no succesfull clot removal  NIH on arrival to unit  8. CTH stat. Family agrees exam stable now compared to improvement at OSH.   Hemorrhagic conversion of stroke on post IR imaging    - Neuro checks/VS q4hrs  - Interval CT head stable  - CBC, CMP, Mag, and phos daily  - SBP goal < 140  - PRN labetalol and hydralazine  - Statin  - VTE ppx: SCDs  - SQH held due to hemorrhagic conversion of stroke  - MRI brain ordered to evaluation stroke burden. Pending evaluation of MRI compatibility of AICD   - PT/OT/SLP  - Level 6 diet  - Stable to SD to VN    Intraparenchymal hemorrhage of brain  Post IR CTH with hemorrhagic conversion of stroke- holding ASA, subq heparin    Left-sided weakness  -PT/OT     Cardiac/Vascular  Abdominal aortic aneurysm (AAA) without rupture  History of     AICD (automatic cardioverter/defibrillator) present  Daughters provided AICD card. Submitted to radiology and in chart to see if MRI compatible, pending cardiac device rep    Primary hypertension  -SBP goal < 140 in setting of  CVA s/p IR with hemorrhagic conversion  -PRN labetalol, hydralazine  -restart home antihypertensives as appropriate    The patient is being Prophylaxed for:  Venous Thromboembolism with: Mechanical  Stress Ulcer with: Not Applicable "   Ventilator Pneumonia with: not applicable    Activity Orders          Diet Soft & Bite Sized (IDDSI Level 6) Thin; Standard Tray: Soft & Bite Sized starting at 09/04 0748    Straight Cath starting at 09/04 0600    Progressive Mobility Protocol (mobilize patient to their highest level of functioning at least twice daily) starting at 09/03 0800    Turn patient starting at 09/03 0000    Elevate HOB starting at 09/02 2210        DNR     Level III    Maria Fernanda Dumas NP  Neurocritical Care  [facility] - Neuro Critical Care

## 2023-09-04 NOTE — PLAN OF CARE
CM spoke w/ pt/pt's dgtr Viviane - pt will not return to listed address. Pt will discharge to dgtr Viviane's home in [place]. Dgtr will have a 1st floor living situation for pt. Prior to admit, pt was living alone, used no DME and driving. Pt has recs for rehab - will f/u.    MILES Parker, RN, [facility]  Transitional Care Manager  [phone]  [email or web address] - Neurosurgery ([facility]  Initial Discharge Assessment       Primary Care Provider: Trista, Primary Doctor    Admission Diagnosis: CVA (cerebral vascular accident) [I63.9]  Acute ischemic right MCA stroke [I63.511]  Left-sided weakness [R53.1]    Admission Date: 9/2/2023  Expected Discharge Date:     Transition of Care Barriers: None    Payor: MEDICARE / Plan: MEDICARE PART A & B / Product Type: Government /     Extended Emergency Contact Information  Primary Emergency Contact: joseluis durbin  Mobile Phone: [phone]  Relation: Daughter  Preferred language: English   needed? No  Secondary Emergency Contact: jose antonio betts  Mobile Phone: [phone]  Relation: Daughter  Preferred language: English   needed? 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 No   will live w/ dgtr in [place]    Do you have help at home or someone to help you manage your care at home? Yes     Who are your caregiver(s) and their phone number(s)? dgtr Viviane     Prior to hospitilization cognitive status: Alert/Oriented     Current  cognitive status: Alert/Oriented     Home Layout Able to live on 1st floor     Equipment Currently Used at Home none     Readmission within 30 days? No     Patient currently being followed by outpatient case management? Unable to determine (comments)     Do you currently have service(s) that help you manage your care at home? No     Who is going to help you get home at discharge? dgtr Viviane     How do you get to doctors appointments? car, drives self     Are you on dialysis? No     Do you take coumadin? No     DME Needed Upon Discharge  other (see comments)   TBD    Discharge Plan discussed with: Patient;Adult children     Transition of Care Barriers None     Discharge Plan A Skilled Nursing Facility     Discharge Plan B Home with family;Home Health        Physical Activity    On average, how many days per week do you engage in moderate to strenuous exercise (like a brisk walk)? 3 days     On average, how many minutes do you engage in exercise at this level? 10 min        Financial Resource Strain    How hard is it for you to pay for the very basics like food, housing, medical care, and heating? Not hard at all        Housing Stability    In the last 12 months, was there a time when you were not able to pay the mortgage or rent on time? No     In the last 12 months, how many places have you lived? 1     In the last 12 months, was there a time when you did not have a steady place to sleep or slept in a shelter (including now)? No        Transportation Needs    In the past 12 months, has lack of transportation kept you from medical appointments or from getting medications? No     In the past 12 months, has lack of transportation kept you from meetings, work, or from getting things needed for daily living? No        Food Insecurity    Within the past 12 months, you worried that your food would run out before you got the money to buy more. Never true     Within the past 12 months, the food you bought just didn't last and  you didn't have money to get more. Never true        Stress    Do you feel stress - tense, restless, nervous, or anxious, or unable to sleep at night because your mind is troubled all the time - these days? Only a little        Social Connections    In a typical week, how many times do you talk on the phone with family, friends, or neighbors? Three times a week     How often do you get together with friends or relatives? Once a week     How often do you attend Congregation or Hoahaoism services? Patient refused     Do you belong to any clubs or organizations such as Congregation groups, unions, fraternal or athletic groups, or school groups? Patient refused     How often do you attend meetings of the clubs or organizations you belong to? Patient refused        Alcohol Use    Q1: How often do you have a drink containing alcohol? Never     Q2: How many drinks containing alcohol do you have on a typical day when you are drinking? Patient does not drink     Q3: How often do you have six or more drinks on one occasion? Never

## 2023-09-04 NOTE — ASSESSMENT & PLAN NOTE
-SBP goal < 140 in setting of  CVA s/p IR with hemorrhagic conversion  -PRN labetalol, hydralazine  -restart home antihypertensives as appropriate

## 2023-09-04 NOTE — ASSESSMENT & PLAN NOTE
Daughters provided AICD card. Submitted to radiology and in chart to see if MRI compatible, pending cardiac device rep

## 2023-09-04 NOTE — PT/OT/SLP PROGRESS
"Physical Therapy Treatment    Patient Name:  London Ryder   MRN:  [id number]  Co-tx w/ OT 2/2 suspected pt complexity and requirement of 2 skilled therapists to assist in order to maximize pt treatment   Recommendations:     Discharge Recommendations: rehabilitation facility  Discharge Equipment Recommendations: to be determined by next level of care  Barriers to discharge:  inc level of assist needed    Assessment:     London Ryder is a 89 y.o. 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  He presents with the following impairments/functional limitations: weakness, impaired endurance, impaired self care skills, impaired functional mobility, gait instability, impaired balance, impaired cognition, decreased coordination, decreased upper extremity function, decreased lower extremity function, decreased safety awareness. Pt progressing well w/ functional mobility, highly motivated to get back to [facility].     Rehab Prognosis: Good; patient would benefit from acute skilled PT services to address these deficits and reach maximum level of function.    Recent Surgery: Procedure(s) (LRB):  ANGIOGRAM-CEREBRAL (N/A)      Plan:     During this hospitalization, patient to be seen 4 x/week to address the identified rehab impairments via gait training, therapeutic activities, therapeutic exercises, neuromuscular re-education and progress toward the following goals:    Plan of Care Expires:  10/03/23    Subjective     Chief Complaint: need to have BM  Patient/Family Comments/goals: "I'm good, no problems"  Pain/Comfort:  Pain Rating 1: 0/10  Pain Rating Post-Intervention 1: 0/10      Objective:     Communicated with RN prior to session.  Patient found HOB elevated with blood pressure cuff, telemetry, pulse ox (continuous), SCD, Condom Catheter upon PT entry to room.     General Precautions: Standard, aspiration, fall  Orthopedic Precautions: N/A  Braces: N/A  Respiratory " Status: Room air     Functional Mobility:  Bed Mobility:     Supine to Sit: moderate assistance  Sit to Supine: maximal assistance  Transfers:     Sit to Stand:  minimum assistance and of 2 persons with rolling walker  Gait: 22' w/ RW Aurelio w/ assist needed to manage RW  Balance: EOB sitting initially SBA progressing to modA w/ L lean as he fatigued; standing balance at sink CGA-Aurelio       AM-PAC 6 CLICK MOBILITY  Turning over in bed (including adjusting bedclothes, sheets and blankets)?: 2  Sitting down on and standing up from a chair with arms (e.g., wheelchair, bedside commode, etc.): 2  Moving from lying on back to sitting on the side of the bed?: 2  Moving to and from a bed to a chair (including a wheelchair)?: 2  Need to walk in hospital room?: 3  Climbing 3-5 steps with a railing?: 2  Basic Mobility Total Score: 13       Treatment & Education:  Pt educated on PT POC/goals, d/c recs, and continued treatment. All questions answered and pt in agreement w/ POC.  Gait training w/ cueing for proper use of AD, step length, step height, postural control, safety awareness, obstacle avoidance, width of EMMETT, proximity to AD, and attention to task   Dynamic standing balance w/ cueing for midline orientation, upright posture, and safety awareness while performing ADLs w/ OT    Patient left HOB elevated with all lines intact, call button in reach, bed alarm on, and RN present..    GOALS:   Multidisciplinary Problems       Physical Therapy Goals          Problem: Physical Therapy    Goal Priority Disciplines Outcome Goal Variances Interventions   Physical Therapy Goal     PT, PT/OT Ongoing, Progressing     Description: Goals to be met by: 23     Patient will increase functional independence with mobility by performin. Supine to sit with Contact Guard Assistance  2. Sit to supine with Contact Guard Assistance  3. Sit to stand transfer with Contact Guard Assistance  4. Bed to chair transfer with Contact Guard  Assistance using LRAD  5. Gait  x 100 feet with Contact Guard Assistance using LRAD.                          Time Tracking:     PT Received On: 09/04/23  PT Start Time: 0746     PT Stop Time: 0824  PT Total Time (min): 38 min     Billable Minutes: Gait Training 23 and Neuromuscular Re-education 15    Treatment Type: Treatment  PT/PTA: PT     Number of PTA visits since last PT visit: 0     09/04/2023

## 2023-09-04 NOTE — ASSESSMENT & PLAN NOTE
89 yo M with PMHx of AAA, AICD, and HTN presents as a transfer from [facility] for possible intervention. Patient presented to OSH with left sided weakness, facial droop and dysarthria. Reportedly his LKN was 1450. Patient got up to use the bathroom, fell and was found down by family who noticed confusion, LSW and facial droop. At baseline is highly functional and performs all of his ADLs independently. Telestroke with Dr. Delvalle, patient with improving symptoms so no thrombolytic was recommended. Imaging concerning for R MCA occlusion for which [place] [facility] [place]. Patient taken to IR, no successful intervention TICI0 of R M2. CTH post IR with R MCA frontal opercular infarct, possible SAH vs contrast and new R temporal IPH.     Neuro exam stable. Working with therapy. Recs for IPR. Daughter requesting spacing out of neurochecks and vital checks. Patient is ok for SD. MRI pending once AICD/cardiac device reps are coordinated.       Antithrombotics for secondary stroke prevention: Antiplatelets: None: Intracerebral Hemorrhage     Statins for secondary stroke prevention and hyperlipidemia, if present:   Statins: Atorvastatin- 40 mg daily    Aggressive risk factor modification: HTN, AAA     Rehab efforts: The patient has been evaluated by a stroke team provider and the therapy needs have been fully considered based off the presenting complaints and exam findings. The following therapy evaluations are needed: PT evaluate and treat, OT evaluate and treat, SLP evaluate and treat, PM&R evaluate for appropriate placement    Diagnostics ordered/pending: CT scan of head without contrast to asses brain parenchyma, MRI head without contrast to assess brain parenchyma, TTE to assess cardiac function/status     VTE prophylaxis: Mechanical prophylaxis: Place SCDs    BP parameters:SBP<160

## 2023-09-04 NOTE — SUBJECTIVE & OBJECTIVE
Neurologic Chief Complaint:  R M2 TICI0    Subjective:     Interval History: Patient is seen for follow-up neurological assessment and treatment recommendations:     Neuro exam stable. Working with therapy. Recs for IPR. Daughter requesting spacing out of neurochecks and vital checks. Patient is ok for SD. MRI pending once AICD/cardiac device reps are coordinated.     HPI, Past Medical, Family, and Social History remains the same as documented in the initial encounter.     Review of Systems   HENT:  Positive for hearing loss (at baseline).    Neurological:  Positive for speech difficulty, weakness and numbness.     Scheduled Meds:   atorvastatin  40 mg Oral Daily    famotidine  20 mg Oral Daily     Continuous Infusions:      PRN Meds:acetaminophen, albuterol sulfate, sodium chloride 0.9%, sodium chloride 0.9%    Objective:     Vital Signs (Most Recent):  Temp: 98.2 °F (36.8 °C) (09/04/23 1105)  Pulse: 85 (09/04/23 1305)  Resp: 20 (09/04/23 1305)  BP: 123/75 (09/04/23 1305)  SpO2: (!) 92 % (09/04/23 1305)  BP Location: Left arm    Vital Signs Range (Last 24H):  Temp:  [97.8 °F (36.6 °C)-98.3 °F (36.8 °C)]   Pulse:  [69-96]   Resp:  [12-36]   BP: (116-156)/(68-88)   SpO2:  [76 %-98 %]   BP Location: Left arm       Physical Exam  Vitals and nursing note reviewed.   Constitutional:       General: He is not in acute distress.  HENT:      Head: Normocephalic and atraumatic.      Right Ear: External ear normal.      Left Ear: External ear normal.   Eyes:      Comments: R gaze, crosses midline  Appears to have a left visual cut   Cardiovascular:      Rate and Rhythm: Normal rate.   Pulmonary:      Effort: Pulmonary effort is normal. No respiratory distress.   Abdominal:      General: There is no distension.   Skin:     General: Skin is warm and dry.   Neurological:      Mental Status: He is alert.      Cranial Nerves: Cranial nerve deficit present.      Sensory: Sensory deficit (L) present.      Motor: Weakness (L) present.  "             Neurological Exam:   LOC: alert  Attention Span: Good   Language: No aphasia  Articulation: Dysarthria  EOM (CN III, IV, VI): R gaze, crosses midline  Facial Movement (CN VII): Lower facial weakness on the Left  Motor: Left hemiparesis with drift, R spontaneous  Sensation: L hemihypoesthesia  L sided neglect  Tone: Normal tone throughout    Laboratory:  CMP:   Recent Labs   Lab 09/04/23 0037   CALCIUM 9.4   ALBUMIN 3.6   PROT 6.6      K 4.5   CO2 23      BUN 19   CREATININE 1.1   ALKPHOS 88   ALT 13   AST 28   BILITOT 1.0       BMP:   Recent Labs   Lab 09/04/23 0037      K 4.5      CO2 23   BUN 19   CREATININE 1.1   CALCIUM 9.4       CBC:   Recent Labs   Lab 09/04/23 0037   WBC 6.60   RBC 4.14*   HGB 13.2*   HCT 40.3      MCV 97   MCH 31.9*   MCHC 32.8       Lipid Panel:   Recent Labs   Lab 09/03/23 0027   CHOL 162   LDLCALC 101.6   HDL 40   TRIG 102       Coagulation:   Recent Labs   Lab 09/03/23 0027   INR 1.0   APTT 31.6       Platelet Aggregation Study: No results for input(s): "PLTAGG", "PLTAGINTERP", "PLTAGREGLACO", "ADPPLTAGGREG" in the last 168 hours.  Hgb A1C:   Recent Labs   Lab 09/03/23 0027   HGBA1C 5.4       TSH:   Recent Labs   Lab 09/03/23 0027   TSH 0.273*         Diagnostic Results     Brain/Vessel Imaging   CT WO 9/3/2023 1218  Stable appearance of the brain with recent right MCA infarct.  Stable intracranial hemorrhage.  No midline shift.    CT WO 9/3/2023 0805  Right MCA frontal opercular infarct.  Prominent subarachnoid hemorrhage/contrast.  New right temporal lobe intraparenchymal hemorrhage with trace intraventricular hemorrhage.  No significant midline shift    CT WO 9/2/2023  Moderate subarachnoid hemorrhage in the sylvian fissure and in the gyri of the temporoparietal lobe on the right.     Residual contrast within the [id number] vessels from previous angiogram.     No evidence of intraparenchymal or interventricular hemorrhage.   "   No evidence of cortical infarct.    IR angio 9/2/2023  1.    Right M2 LVO s/p direct catheter aspiration x4 & mechanical thrombectomy x1 (TICI 0)  2.    Otherwise normal cerebral angiogram.    CTA at OSH per paper read with R MCA occlusion    Cardiac Imaging   Echo 9/3/2023    Left Ventricle: The left ventricle is normal in size. Increased wall thickness. There is concentric remodeling. Normal wall motion. There is normal systolic function with a visually estimated ejection fraction of 55 - 60%. There is indeterminate diastolic function.    Right Ventricle: Normal right ventricular cavity size. Right ventricle wall motion  is normal. Systolic function is normal.    Left Atrium: Left atrium is severely dilated. Agitated saline study of the atrial septum is negative, suggesting absence of intracardiac shunt at the atrial level.    Aortic Valve: There is moderate aortic valve sclerosis. Moderately restricted motion. There is mild stenosis. Aortic valve area by VTI is 2.07 cm². Aortic valve peak velocity is 2.13 m/s. Mean gradient is 12 mmHg. The dimensionless index is 0.35. There is mild aortic regurgitation.    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 There is mild to moderate regurgitation with multiple jets.    Pulmonary Artery: The estimated pulmonary artery systolic pressure is 35 mmHg.    IVC/SVC: Intermediate venous pressure at 8 mmHg.    Aorta: Aortic root is mildly dilated measuring 4.06 cm. Ascending aorta is mildly dilated measuring 4.29 cm.

## 2023-09-04 NOTE — PLAN OF CARE
"[facility] Care Plan    POC reviewed with London Ryder and family at 1400. Pt verbalized understanding. Questions and concerns addressed. No acute events today. Pt progressing toward goals. Will continue to monitor. See below and flowsheets for full assessment and VS info.     -Evaluated by PT  -Vital signs Q4H  -Neuro checks Q4H        Is this a stroke patient? yes- Stroke booklet reviewed with patient, risk factors identified for patient and stroke booklet remains at bedside for ongoing education.     Neuro:  Raghavendra Coma Scale  Best Eye Response: 4-->(E4) spontaneous  Best Motor Response: 6-->(M6) obeys commands  Best Verbal Response: 5-->(V5) oriented  [place] Coma Scale Score: 15  Assessment Qualifiers: patient not sedated/intubated  Pupil PERRLA: yes     24 hr Temp:  [97.8 °F (36.6 °C)-98.3 °F (36.8 °C)]     CV:   Rhythm: paced rhythm  BP goals:   SBP < 160  MAP > 65    Resp:           Plan: N/A    GI/:     Diet/Nutrition Received: regular  Last Bowel Movement: 09/01/23  Voiding Characteristics: incontinence    Intake/Output Summary (Last 24 hours) at 9/4/2023 1428  Last data filed at 9/4/2023 1305  Gross per 24 hour   Intake 480 ml   Output 1350 ml   Net -870 ml     Unmeasured Output  Urine Occurrence: 1  Stool Occurrence: 1  Pad Count: 1    Labs/Accuchecks:  Recent Labs   Lab 09/04/23  0037   WBC 6.60   RBC 4.14*   HGB 13.2*   HCT 40.3         Recent Labs   Lab 09/04/23  0037      K 4.5   CO2 23      BUN 19   CREATININE 1.1   ALKPHOS 88   ALT 13   AST 28   BILITOT 1.0      Recent Labs   Lab 09/03/23  0027   INR 1.0   APTT 31.6    No results for input(s): "CPK", "CPKMB", "TROPONINI", "MB" in the last 168 hours.    Electrolytes: No replacement orders  Accuchecks: none    Gtts:      LDA/Wounds:  Lines/Drains/Airways       Drain  Duration             Male External Urinary Catheter 09/03/23 0132 Small 1 day                  Wounds: Yes  Wound care consulted: Yes   "

## 2023-09-04 NOTE — PT/OT/SLP PROGRESS
Occupational Therapy   Co-Treatment with Physical Therapy  Patient required co-tx with PT secondary to need for multiple set of skilled hands to provide safest therapy and best outcomes.      Name: London Ryder  MRN: [id number]  Admitting Diagnosis:  Stroke due to embolism of right middle cerebral artery       Recommendations:     Discharge Recommendations: rehabilitation facility  Discharge Equipment Recommendations:  to be determined by next level of care  Barriers to discharge:  None    Assessment:     London Ryder is a 89 y.o. male with a medical diagnosis of Stroke due to embolism of right middle cerebral artery.  He presents with the following performance deficits affecting function: weakness, impaired endurance, impaired self care skills, impaired functional mobility, gait instability, impaired balance, impaired cognition, decreased coordination, decreased upper extremity function, decreased lower extremity function, decreased safety awareness, decreased ROM, impaired fine motor. Pt agreeable to therapy on this date. Mr. Ryder presents with confusion, oriented only to self and hospital. Pt requiring increased time for processing, and impaired initiation. Pt with L neglect, no tracking to L past midline on this date. Presents with decreased coordination of LUE. Pt would benefit from inpatient rehab when medically stable to facilitate safe return to [facility], maximize functional independence and quality of life, and decrease caregiver burden.      Rehab Prognosis:  Good; patient would benefit from acute skilled OT services to address these deficits and reach maximum level of function.       Plan:     Patient to be seen 4 x/week to address the above listed problems via self-care/home management, therapeutic activities, therapeutic exercises, neuromuscular re-education  Plan of Care Expires: 10/01/23  Plan of Care Reviewed with: patient, daughter    Subjective     Chief Complaint: Need to  "defecate  Patient/Family Comments/goals: "I'm good, I have no problems"  Pain/Comfort:  Pain Rating 1: 0/10    Objective:     Communicated with: RN prior to session.  Patient found HOB elevated with blood pressure cuff, telemetry, pulse ox (continuous), rolon catheter, SCD upon OT entry to room.    General Precautions: Standard, aspiration, fall    Orthopedic Precautions:N/A  Braces: N/A  Respiratory Status: Room air     Occupational Performance:     Bed Mobility:    Patient completed Rolling/Turning to Left with  moderate assistance  Patient completed Rolling/Turning to Right with moderate assistance  Patient completed Scooting/Bridging with moderate assistance  Patient completed Supine to Sit with moderate assistance  Patient completed Sit to Supine with maximal assistance     Functional Mobility/Transfers:  Patient completed Sit <> Stand Transfer with minimum assistance and of 2 persons  with  rolling walker   Functional Mobility: Pt ambulated 22' with Min A, RW to promote functional mobility and endurance required for functional household distances.   Balance: Sitting EOB, pt required SBA, progressing to Mod A for sitting balance 2/2 L lean following ambulation. Standing balance - pt requiring CGA, progressing to Min A for standing balance.    Activities of Daily Living:  Grooming: moderate assistance to complete oral hygiene in stance at sink; CGA for face hygiene. Pt required verbal cues for initiation and sequencing for oral hygiene   Upper Body Dressing: moderate assistance to don/doff gown as robe  Lower Body Dressing: total assistance to doff socks  Toileting: dependence condom cath in place, incontinent of bowel ; required assist for alfred hygiene      Treatment & Education:  Pt educated on the following:  - role of OT and OT POC  - use of call light to request for assistance with all functional mobility to ensure safety during hospital stay  - importance of continued mobilization  - benefits of continued " participation in therapy.   - Pt provided with orientation information.  - Pt educated on importance of calling for staff assist for functional mobility/transfers.  - All pt questions within OT scope of practice addressed, pt verbalized understanding.      Patient left HOB elevated with all lines intact, call button in reach, and RN present    GOALS:   Multidisciplinary Problems       Occupational Therapy Goals          Problem: Occupational Therapy    Goal Priority Disciplines Outcome Interventions   Occupational Therapy Goal     OT, PT/OT Ongoing, Progressing    Description: Goals to be met by: 9/17/23     Patient will increase functional independence with ADLs by performing:    Grooming while EOB with Set-up Assistance.  Rolling to Left with Minimal Assistance.   Supine to sit with Minimal Assistance.  Stand pivot transfers with Contact Guard Assistance.  Step transfer with Contact Guard Assistance                         Time Tracking:     OT Date of Treatment: 09/04/23  OT Start Time: 0748  OT Stop Time: 0823  OT Total Time (min): 35 min    Billable Minutes:Self Care/Home Management 13  Therapeutic Activity 12    OT/AMY: OT          9/4/2023

## 2023-09-04 NOTE — PLAN OF CARE
POC reviewed, goals remain appropriate  Problem: Occupational Therapy  Goal: Occupational Therapy Goal  Description: Goals to be met by: 9/17/23     Patient will increase functional independence with ADLs by performing:    Grooming while EOB with Set-up Assistance.  Rolling to Left with Minimal Assistance.   Supine to sit with Minimal Assistance.  Stand pivot transfers with Contact Guard Assistance.  Step transfer with Contact Guard Assistance    Outcome: Ongoing, Progressing

## 2023-09-04 NOTE — PLAN OF CARE
Problem: Cognitive Impairment (Stroke, Ischemic/Transient Ischemic Attack)  Goal: Optimal Cognitive Function  Outcome: Ongoing, Progressing     Problem: Adult Inpatient Plan of Care  Goal: Readiness for Transition of Care  Outcome: Ongoing, Progressing     POC reviewed with the patient and they verbalized understanding.woundcare done . Patient  family requested to transfer the patient to placement ASAP, called on call case mgt to send the referral for the placement,  All comments and concerns addressed. Bed locked in lowest position with bed alarm set, call light within reach. Safety precautions maintained. VSS, see flow sheets. Will continue to monitor for changes to POC and clinical condition.

## 2023-09-04 NOTE — NURSING TRANSFER
Nursing Transfer Note      9/4/2023   3:42 PM      Reason patient is being transferred: NPU    Transfer From: [id number]  To:[id number]    Transfer via wheelchair    Transfer with cardiac monitoring    Transported by MIKHAIL Butler    Transfer Vital Signs:  Blood Pressure:125/75  Heart Rate:72  O2:93  Temperature:98.1  Respirations:20    Telemetry: Box Number [id number]  Order for Tele Monitor? Yes    Additional Lines: Norman Catheter    4eyes on Skin: yes    Medicines sent: N/A    Any special needs or follow-up needed: N/A    Patient belongings transferred with patient: Yes    Chart send with patient: Yes    Notified: daughter      1  Upon arrival to floor: call bell in reach

## 2023-09-05 ENCOUNTER — DOCUMENTATION ONLY (OUTPATIENT)
Dept: ELECTROPHYSIOLOGY | Facility: CLINIC | Age: 88
End: 2023-09-05
Payer: MEDICARE

## 2023-09-05 LAB
ALBUMIN SERPL BCP-MCNC: 3.5 G/DL (ref 3.5–5.2)
ALP SERPL-CCNC: 81 U/L (ref 55–135)
ALT SERPL W/O P-5'-P-CCNC: 13 U/L (ref 10–44)
ANION GAP SERPL CALC-SCNC: 10 MMOL/L (ref 8–16)
AST SERPL-CCNC: 28 U/L (ref 10–40)
BILIRUB SERPL-MCNC: 1.4 MG/DL (ref 0.1–1)
BUN SERPL-MCNC: 18 MG/DL (ref 10–30)
CALCIUM SERPL-MCNC: 9.4 MG/DL (ref 8.7–10.5)
CHLORIDE SERPL-SCNC: 104 MMOL/L (ref 95–110)
CO2 SERPL-SCNC: 20 MMOL/L (ref 23–29)
CREAT SERPL-MCNC: 1 MG/DL (ref 0.5–1.4)
EST. GFR  (NO RACE VARIABLE): >60 ML/MIN/1.73 M^2
GLUCOSE SERPL-MCNC: 96 MG/DL (ref 70–110)
MAGNESIUM SERPL-MCNC: 2.1 MG/DL (ref 1.6–2.6)
PHOSPHATE SERPL-MCNC: 3 MG/DL (ref 2.7–4.5)
POTASSIUM SERPL-SCNC: 4.4 MMOL/L (ref 3.5–5.1)
PROT SERPL-MCNC: 6.5 G/DL (ref 6–8.4)
SODIUM SERPL-SCNC: 134 MMOL/L (ref 136–145)

## 2023-09-05 PROCEDURE — 84100 ASSAY OF PHOSPHORUS: CPT

## 2023-09-05 PROCEDURE — 11000001 HC ACUTE MED/SURG PRIVATE ROOM

## 2023-09-05 PROCEDURE — 25000003 PHARM REV CODE 250

## 2023-09-05 PROCEDURE — 80053 COMPREHEN METABOLIC PANEL: CPT

## 2023-09-05 PROCEDURE — 83735 ASSAY OF MAGNESIUM: CPT

## 2023-09-05 PROCEDURE — 92523 SPEECH SOUND LANG COMPREHEN: CPT

## 2023-09-05 PROCEDURE — 99222 1ST HOSP IP/OBS MODERATE 55: CPT | Mod: ,,, | Performed by: NURSE PRACTITIONER

## 2023-09-05 PROCEDURE — 99222 PR INITIAL HOSPITAL CARE,LEVL II: ICD-10-PCS | Mod: ,,, | Performed by: NURSE PRACTITIONER

## 2023-09-05 PROCEDURE — 97535 SELF CARE MNGMENT TRAINING: CPT

## 2023-09-05 PROCEDURE — 36415 COLL VENOUS BLD VENIPUNCTURE: CPT

## 2023-09-05 RX ADMIN — FAMOTIDINE 20 MG: 20 TABLET ORAL at 08:09

## 2023-09-05 RX ADMIN — ACETAMINOPHEN 650 MG: 325 TABLET ORAL at 08:09

## 2023-09-05 RX ADMIN — ATORVASTATIN CALCIUM 40 MG: 40 TABLET, FILM COATED ORAL at 08:09

## 2023-09-05 RX ADMIN — ACETAMINOPHEN 650 MG: 325 TABLET ORAL at 04:09

## 2023-09-05 NOTE — CONSULTS
Charan Qureshi - Neurosurgery ([facility])  Physical Medicine & Rehab  Consult Note    Patient Name: London Ryder  MRN: [id number]  Admission Date: 9/2/2023  Hospital Length of Stay: 3 days  Attending Physician: Daniel Dai MD     Inpatient consult to Physical Medicine & Rehabilitation  Consult performed by: Lauren Grant NP  Consult requested by:  Daniel Dai MD    Collaborating Physician: Sera Alfonso MD  Reason for Consult:  Assess rehabilitation needs     Consults  Subjective:     Principal Problem: Stroke due to embolism of right middle cerebral artery    HPI: London Ryder is a 89-year-old male with PMHx of AAA, AICD, and HTN. [place] [facility] [place] [facility] for possible intervention for R MCA occulusion. Patient taken to IR, no successful intervention. CTH post IR with R MCA frontal opercular infarct, possible SAH vs contrast and new R temporal IPH.     Functional History: Patient lives alone. Pt will stay with one daughter after d/c who has a first floor set up for him, no stairs. Prior to admission, I. DME: none.     Hospital Course:   9/4/23: Participated w/ PT. Ambulated 22 ft Aurelio w/ RW.     No past medical history on file.  No past surgical history on file.  Review of patient's allergies indicates:  No Known Allergies    Scheduled Medications:    atorvastatin  40 mg Oral Daily    famotidine  20 mg Oral Daily       PRN Medications: acetaminophen, albuterol sulfate, sodium chloride 0.9%, sodium chloride 0.9%    Family History    None       Tobacco Use    Smoking status: Not on file    Smokeless tobacco: Not on file   Substance and Sexual Activity    Alcohol use: Not on file    Drug use: Not on file    Sexual activity: Not on file     Review of Systems   Constitutional:  Positive for activity change.   HENT:  Positive for trouble swallowing.    Musculoskeletal:  Positive for gait problem.   Neurological:  Positive for speech difficulty and weakness.    Psychiatric/Behavioral:  Positive for confusion. Negative for agitation and behavioral problems.      Objective:     Vital Signs (Most Recent):  Temp: 97.8 °F (36.6 °C) (09/05/23 0823)  Pulse: 70 (09/05/23 0823)  Resp: 16 (09/05/23 0823)  BP: 137/77 (09/05/23 0823)  SpO2: 95 % (09/05/23 0823)    Vital Signs (24h Range):  Temp:  [72 °F (22.2 °C)-98.8 °F (37.1 °C)] 97.8 °F (36.6 °C)  Pulse:  [68-85] 70  Resp:  [14-36] 16  SpO2:  [92 %-96 %] 95 %  BP: (123-162)/(75-92) 137/77     Body mass index is 26.16 kg/m².     Physical Exam  Vitals and nursing note reviewed.   HENT:      Nose: Nose normal.      Mouth/Throat:      Mouth: Mucous membranes are moist.   Eyes:      Extraocular Movements: Extraocular movements intact.      Pupils: Pupils are equal, round, and reactive to light.   Pulmonary:      Effort: Pulmonary effort is normal. No respiratory distress.   Musculoskeletal:      Comments: R sided weakness   Skin:     General: Skin is warm.   Neurological:      Mental Status: He is alert.      Motor: Weakness present.      Gait: Gait abnormal.      Comments: Following commands   Cog impairment   Psychiatric:         Mood and Affect: Mood normal.     Diagnostic Results:   Labs: Reviewed  ECG: Reviewed  CT: Reviewed    Assessment/Plan:     * Stroke due to embolism of right middle cerebral artery  - Related to prolonged/acute hospital course.     Recommendations  -  Encourage mobility, OOB in chair at least 3 hours per day, and early ambulation as appropriate  -  PT/OT evaluate and treat  -  Pain management  -  Monitor for and prevent skin breakdown and pressure ulcers  · Early mobility, repositioning/weight shifting every 20-30 minutes when sitting, turn patient every 2 hours, proper mattress/overlay and chair cushioning, pressure relief/heel protector boots  -  DVT prophylaxis    -  Reviewed discharge options (IP rehab, SNF, HH therapy, and OP therapy)    Left-sided weakness  - PT & OT following     PM&R Recommendation:      At this time, the PM&R team has reviewed this patient's ongoing medical case including inpatient diagnosis, medical history, clinical examination, labs, vitals, current social and functional history to provide the post-acute recommendation as follows:     RECOMMENDATIONS: inpatient rehabilitation due to good motivation/participation with therapies, has been determined to tolerate 3 hours of therapy and good potential for recovery.     The patient will be admitted for comprehensive interdisciplinary inpatient rehabilitation to address the impairments due to medical diagnosis of L MCA. The patient will benefit from an inpatient rehabilitation program to promote functional recovery, implement compensatory strategies and will undergo assessment for needs for durable medical equipment for safe discharge to the community. This patient will benefit from a coordinated interdisciplinary rehabilitation program services that require close monitoring and treatment with 24-hour rehabilitative nursing and physical/occupational therapies for 3 hours/day for 5 days/week.This interdisciplinary program will be performed under the direction of a physiatrist.    We will continue to follow.    Thank you for your consult.     Lauren Grant NP  Department of Physical Medicine & Rehab  [facility])

## 2023-09-05 NOTE — ANESTHESIA POSTPROCEDURE EVALUATION
Anesthesia Post Evaluation    Patient: London Ryder    Procedure(s) Performed: * No procedures listed *    Final Anesthesia Type: general      Patient location during evaluation: PACU  Patient participation: Yes- Able to Participate  Level of consciousness: awake and alert and oriented  Pain management: adequate  Airway patency: patent    PONV status at discharge: No PONV  Anesthetic complications: no      Cardiovascular status: blood pressure returned to baseline and hemodynamically stable  Respiratory status: unassisted  Hydration status: euvolemic  Follow-up not needed.          Vitals Value Taken Time   /77 09/05/23 0823   Temp 36.6 °C (97.8 °F) 09/05/23 0823   Pulse 70 09/05/23 0823   Resp 16 09/05/23 0823   SpO2 95 % 09/05/23 0823         No case tracking events are documented in the log.      Pain/Marissa Score: Pain Rating Prior to Med Admin: 7 (9/5/2023  8:10 AM)

## 2023-09-05 NOTE — PLAN OF CARE
Problem: Adult Inpatient Plan of Care  Goal: Plan of Care Review  Outcome: Ongoing, Progressing  Goal: Patient-Specific Goal (Individualized)  Outcome: Ongoing, Progressing  Goal: Absence of Hospital-Acquired Illness or Injury  Outcome: Ongoing, Progressing  Goal: Optimal Comfort and Wellbeing  Outcome: Ongoing, Progressing  Goal: Readiness for Transition of Care  Outcome: Ongoing, Progressing     Problem: Adjustment to Illness (Stroke, Ischemic/Transient Ischemic Attack)  Goal: Optimal Coping  Outcome: Ongoing, Progressing     Problem: Bowel Elimination Impaired (Stroke, Ischemic/Transient Ischemic Attack)  Goal: Effective Bowel Elimination  Outcome: Ongoing, Progressing     Problem: Cerebral Tissue Perfusion (Stroke, Ischemic/Transient Ischemic Attack)  Goal: Optimal Cerebral Tissue Perfusion  Outcome: Ongoing, Progressing     Problem: Cognitive Impairment (Stroke, Ischemic/Transient Ischemic Attack)  Goal: Optimal Cognitive Function  Outcome: Ongoing, Progressing     Problem: Communication Impairment (Stroke, Ischemic/Transient Ischemic Attack)  Goal: Improved Communication Skills  Outcome: Ongoing, Progressing     Problem: Functional Ability Impaired (Stroke, Ischemic/Transient Ischemic Attack)  Goal: Optimal Functional Ability  Outcome: Ongoing, Progressing     Problem: Respiratory Compromise (Stroke, Ischemic/Transient Ischemic Attack)  Goal: Effective Oxygenation and Ventilation  Outcome: Ongoing, Progressing     Problem: Fall Injury Risk  Goal: Absence of Fall and Fall-Related Injury  Outcome: Ongoing, Progressing     Problem: Skin Injury Risk Increased  Goal: Skin Health and Integrity  Outcome: Ongoing, Progressing     Problem: Infection  Goal: Absence of Infection Signs and Symptoms  Outcome: Ongoing, Progressing   Plan of care reviewed with patient, question and answers provide, patient has good under stand of POC, goal is to return to his baseline and return to SNF.

## 2023-09-05 NOTE — PLAN OF CARE
09/05/23 1507   Post-Acute Status   Post-Acute Authorization Placement   Post-Acute Placement Status Referrals Sent   Discharge Plan   Discharge Plan A Rehab     SW met with patient's dtr Lesa at bedside.  SW reviewed the SNF referrals that were sent out and she insisted they were Rehab.   SW offered to send rehab referrals out and she consented.  SW sent out to 5 facilties within 25 miles of the [address] [place], [place] [address]code that pateint will be discharging to after therapy.  Patient has several accepting facilities and they are following up with the patient's dtr to provide info about their program.  Pending decision by family at this time.      Maru Tellez, BOBBY  [facility]  [phone]

## 2023-09-05 NOTE — PROGRESS NOTES
Charan Qureshi - Neurosurgery ([facility])  Vascular Neurology  Comprehensive Stroke Center  Progress Note    Assessment/Plan:     * Stroke due to embolism of right middle cerebral artery  89 yo M with PMHx of AAA, AICD, and HTN presents as a transfer from [facility] for possible intervention. Patient presented to OSH with left sided weakness, facial droop and dysarthria. Reportedly his LKN was 1450. Patient got up to use the bathroom, fell and was found down by family who noticed confusion, LSW and facial droop. At baseline is highly functional and performs all of his ADLs independently. Telestroke with Dr. Delvalle, patient with improving symptoms so no thrombolytic was recommended. Imaging concerning for R MCA occlusion for which patient was transferred to [facility] [place] [place] for possible intervention. Patient taken to IR, no successful intervention TICI0 of R M2. CTH post IR with R MCA frontal opercular infarct, possible SAH vs contrast and new R temporal IPH.     Neuro exam stable. Working with therapy. Recs for IPR. Daughter requesting spacing out of neurochecks and vital checks. Patient is ok for SD. MRI pending once AICD/cardiac device reps are coordinated.       Antithrombotics for secondary stroke prevention: Antiplatelets: None: Intracerebral Hemorrhage     Statins for secondary stroke prevention and hyperlipidemia, if present:   Statins: Atorvastatin- 40 mg daily    Aggressive risk factor modification: HTN, AAA     Rehab efforts: The patient has been evaluated by a stroke team provider and the therapy needs have been fully considered based off the presenting complaints and exam findings. The following therapy evaluations are needed: PT evaluate and treat, OT evaluate and treat, SLP evaluate and treat, PM&R evaluate for appropriate placement    Diagnostics ordered/pending: CT scan of head without contrast to asses brain parenchyma, MRI head without contrast to assess brain parenchyma, TTE to assess cardiac function/status      VTE prophylaxis: Mechanical prophylaxis: Place SCDs    BP parameters:SBP<160        Intraparenchymal hemorrhage of brain  See principal problem   R temporal IPH      AICD (automatic cardioverter/defibrillator) present  Pending coordination with reps for patient to be able to go for MRI    Primary hypertension  Stroke RF  SBP<160    Left-sided weakness  PT/OT-recs for IPR       9/3/2023 Patient was taken to IR yesterday, no successful intervention TICI0 of R M2. CTH post IR with R MCA frontal opercular infarct, possible SAH vs contrast and new R temporal IPH. Exam notable for R gaze(crosses midline), LUE/LLE drift, dysarthria, L hemihypoesthesia and L neglect. Recommend holding ASA in setting of possible new IPH. Will continue to follow.   9/4/2023 Neuro exam stable. Working with therapy. Recs for IPR. Daughter requesting spacing out of neurochecks and vital checks. Patient is ok for SD.   9/5/2023 mild dysarthria, left sided hemiparesis, left neglect and no drift.  Neuro exam stable. ECHO was done with EF 55-60%, and LA severely dilated.  Pt has a pacemaker which incompatible with MRI.  Plan to repeat CT head today.      STROKE DOCUMENTATION   Acute Stroke Times   Last Known Normal Date: 09/02/23  Last Known Normal Time: 1450  Symptom Onset Date: 09/02/23  Symptom Onset Time: 1450  Stroke Team Called Date: 09/02/23  Stroke Team Called Time: 2024  Stroke Team Arrival Date: 09/02/23  Stroke Team Arrival Time: 2025  CT Interpretation Time:  (done at OSH, none done on arrival)  Thrombolytic Therapy Recommended: No  Thrombectomy Recommended: Yes  Decision to Treat Time for IR:  (decision made by IR team prior to arrival around 1934)    NIH Scale:  1a. Level of Consciousness: 0-->Alert, keenly responsive  1b. LOC Questions: 0-->Answers both questions correctly  1c. LOC Commands: 0-->Performs both tasks correctly  2. Best Gaze: 1-->Partial gaze palsy, gaze is abnormal in one or both eyes, but forced deviation or total  "gaze paresis is not present  3. Visual: 2-->Complete hemianopia  4. Facial Palsy: 0-->Normal symmetrical movements  5a. Motor Arm, Left: 0-->No drift, limb holds 90 (or 45) degrees for full 10 secs  5b. Motor Arm, Right: 0-->No drift, limb holds 90 (or 45) degrees for full 10 secs  6a. Motor Leg, Left: 0-->No drift, leg holds 30 degree position for full 5 secs  6b. Motor Leg, Right: 0-->No drift, leg holds 30 degree position for full 5 secs  7. Limb Ataxia: 0-->Absent  8. Sensory: 0-->Normal, no sensory loss  9. Best Language: 1-->Mild-to-moderate aphasia, some obvious loss of fluency or facility of comprehension, without significant limitation on ideas expressed or form of expression. Reduction of speech and/or comprehension, however, makes conversation. . . (see row details)  10. Dysarthria: 1-->Mild-to-moderate dysarthria, patient slurs at least some words and, at worst, can be understood with some difficulty  11. Extinction and Inattention (formerly Neglect): 0-->No abnormality  Total (NIH Stroke Scale): 5       Modified Austin Score: 0  Dupuyer Coma Scale:    ABCD2 Score:    [id number] Score:   HAS -BLED Score:   ICH Score:   Hunt & Danielle Classification:      Hemorrhagic change of an Ischemic Stroke: Does this patient have an ischemic stroke with hemorrhagic changes? No     Neurologic Chief Complaint:     Subjective:     Interval History: Patient is seen for follow-up neurological assessment and treatment recommendations:   No acute event overnight. Pt reports that he is doing" good".  Pt is moving his 4 extremities, dysarthria and  with left side neglect. Pt continue working with PT   HPI, Past Medical, Family, and Social History remains the same as documented in the initial encounter.     Review of Systems   Constitutional:  Negative for diaphoresis and fever.   HENT:  Negative for nosebleeds and rhinorrhea.    Eyes:  Negative for discharge and redness.   Respiratory:  Negative for cough and shortness of " breath.    Cardiovascular:  Negative for chest pain and leg swelling.   Gastrointestinal:  Negative for blood in stool, nausea and vomiting.   Genitourinary:  Negative for difficulty urinating and hematuria.   Neurological:  Negative for facial asymmetry and weakness.     Scheduled Meds:   atorvastatin  40 mg Oral Daily    famotidine  20 mg Oral Daily     Continuous Infusions:  PRN Meds:acetaminophen, albuterol sulfate, sodium chloride 0.9%, sodium chloride 0.9%    Objective:     Vital Signs (Most Recent):  Temp: 97.8 °F (36.6 °C) (09/05/23 0823)  Pulse: 70 (09/05/23 0823)  Resp: 16 (09/05/23 0823)  BP: 137/77 (09/05/23 0823)  SpO2: 95 % (09/05/23 0823)  BP Location: Right arm    Vital Signs Range (Last 24H):  Temp:  [72 °F (22.2 °C)-98.8 °F (37.1 °C)]   Pulse:  [68-85]   Resp:  [14-36]   BP: (116-162)/(68-92)   SpO2:  [92 %-97 %]   BP Location: Right arm       Physical Exam  Vitals reviewed.   Constitutional:       Appearance: Normal appearance.   HENT:      Head: Normocephalic and atraumatic.   Cardiovascular:      Rate and Rhythm: Normal rate and regular rhythm.   Pulmonary:      Effort: Pulmonary effort is normal.   Abdominal:      General: Abdomen is flat.      Palpations: Abdomen is soft.   Neurological:      Mental Status: He is alert.          Neurological Exam:   Language: Expressive aphasia, Repetition impaired  Articulation: Dysarthria  Orientation: Not oriented to time  Visual Fields: Full  Visual neglect  EOM (CN III, IV, VI): Gaze preference  right  Facial Sensation (CN V): Normal  Facial Movement (CN VII): Symmetric facial expression    Reflexes: 2+ throughout  Motor: Arm left  Paresis: 2/5  Leg left  Paresis: 3/5  Sensation: Peterson-hypoesthesia left    Laboratory:  CMP:   Recent Labs   Lab 09/05/23  0630   CALCIUM 9.4   ALBUMIN 3.5   PROT 6.5   *   K 4.4   CO2 20*      BUN 18   CREATININE 1.0   ALKPHOS 81   ALT 13   AST 28   BILITOT 1.4*     CBC:   Recent Labs   Lab 09/04/23  0037   WBC  6.60   RBC 4.14*   HGB 13.2*   HCT 40.3      MCV 97   MCH 31.9*   MCHC 32.8     Lipid Panel:   Recent Labs   Lab 09/03/23  0027   CHOL 162   LDLCALC 101.6   HDL 40   TRIG 102     Coagulation:   Recent Labs   Lab 09/03/23 0027   INR 1.0   APTT 31.6     Hgb A1C:   Recent Labs   Lab 09/03/23 0027   HGBA1C 5.4     TSH:   Recent Labs   Lab 09/03/23 0027   TSH 0.273*       Diagnostic Results     Brain/Vessel Imaging   CTH WO 9/3/2023 1218  Stable appearance of the brain with recent right MCA infarct.  Stable intracranial hemorrhage.  No midline shift.     [facility] WO 9/3/2023 0805  Right MCA frontal opercular infarct.  Prominent subarachnoid hemorrhage/contrast.  New right temporal lobe intraparenchymal hemorrhage with trace intraventricular hemorrhage.  No significant midline shift     [facility] WO 9/2/2023  Moderate subarachnoid hemorrhage in the sylvian fissure and in the gyri of the temporoparietal lobe on the right.     Residual contrast within the [id number] vessels from previous angiogram.     No evidence of intraparenchymal or interventricular hemorrhage.     No evidence of cortical infarct.     IR angio 9/2/2023  1.    Right M2 LVO s/p direct catheter aspiration x4 & mechanical thrombectomy x1 (TICI 0)  2.    Otherwise normal cerebral angiogram.     CTA at OSH per paper read with R MCA occlusion     Cardiac Imaging   Echo 9/3/2023    Left Ventricle: The left ventricle is normal in size. Increased wall thickness. There is concentric remodeling. Normal wall motion. There is normal systolic function with a visually estimated ejection fraction of 55 - 60%. There is indeterminate diastolic function.    Right Ventricle: Normal right ventricular cavity size. Right ventricle wall motion  is normal. Systolic function is normal.    Left Atrium: Left atrium is severely dilated. Agitated saline study of the atrial septum is negative, suggesting absence of intracardiac shunt at the atrial level.    Aortic Valve:  There is moderate aortic valve sclerosis. Moderately restricted motion. There is mild stenosis. Aortic valve area by VTI is 2.07 cm². Aortic valve peak velocity is 2.13 m/s. Mean gradient is 12 mmHg. The dimensionless index is 0.35. There is mild aortic regurgitation.  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 There is mild to moderate regurgitation with multiple jets.    Pulmonary Artery: The estimated pulmonary artery systolic pressure is 35 mmHg.    IVC/SVC: Intermediate venous pressure at 8 mmHg.    Aorta: Aortic root is mildly dilated measuring 4.06 cm. Ascending aorta is mildly dilated measuring 4.29 cm.      Denia Enriquez MD  Comprehensive Stroke Center  Department of Vascular Neurology   [facility])

## 2023-09-05 NOTE — PLAN OF CARE
[facility] completed LOCET via phone. [facility] faxed PASSR to obtain the 142 for NH admission.    MELLISA Granger  [phone]

## 2023-09-05 NOTE — PT/OT/SLP EVAL
"Speech Language Pathology Evaluation  Cognitive Communication    Patient Name:  London Ryder   MRN:  [id number]  Admitting Diagnosis: Stroke due to embolism of right middle cerebral artery    Recommendations:     Recommendations:                General Recommendations:  Dysphagia therapy, Speech/language therapy, and Cognitive-linguistic therapy  Diet recommendations:  Soft & Bite Sized Diet - IDDSI Level 6, Thin liquids - IDDSI Level 0   Aspiration Precautions: Small bites/sips and Standard aspiration precautions   General Precautions: Standard, aspiration, fall  Communication strategies:   hard of hearing    Assessment:     London Ryder is a 89 y.o. male with an SLP diagnosis of Dysphagia, Dysarthria, and Visio-Spatial Impairment.  He presents with good progress in session.    History:     No past medical history on file.    No past surgical history on file.    Social History: Patient lives alone.    Prior Intubation HX:  n/a    Modified Barium Swallow: n/a    Prior diet: regular/thin.    Occupation/hobbies/homemaking: retired      Subjective     "My memory was always bad"  Patient goals: to go home     Pain/Comfort:  Pain Rating 1: 0/10  Pain Rating Post-Intervention 1: 0/10    Respiratory Status: Room air    Objective:     Cognitive Status:    Pt was ox3. He recalled up to 4 digits and 3 words immediately. After a delay, he recalled 1/3 objects with cues. Verbal responses to hypothetical problems were accurate and pt able to generate multiple solutions. He compared/contrasted items accurately. He completed categorizational task with 33% acc and with cues 66% acc. Delays noted. He named 8 items during word fluency task when 15 is wfl.      Receptive Language:   Comprehension:      WFL for complex commands and questions    Pragmatics:    wfl    Expressive Language:  Verbal:    Pt able to express his wants/needs    Motor Speech:  Dysarthria mild; however, pt reported he felt speech at baseline    Voice: "   WFL    Visual-Spatial:  Left neglect noted; further assessment needed    Reading:   tba    Written Expression:   tba    Treatment: Pt very  hard of hearing with repetition of items needed throughout session as well as increased volume. pt requesting bathroom at end of session Pt educated on how to use call button but had difficulty hearing the  when she asked what pt needed. Helped pt call for assistance. Education provided to pt re: left neglect, role of slp and poc. Pt will need ongoing education.     Goals:   Multidisciplinary Problems       SLP Goals          Problem: SLP    Goal Priority Disciplines Outcome   SLP Goal     SLP    Description: Goals expected to be met by 9/10:  1. Pt will tolerate least restrictive diet without overt s/sx airway threat.   2. Pt will participate in cognitive-linguistic evaluation to further develop POC.                            Plan:   Patient to be seen:  4 x/week   Plan of Care expires:  10/02/23  Plan of Care reviewed with:  patient   SLP Follow-Up:  Yes       Discharge recommendations:  Discharge Facility/Level of Care Needs: rehabilitation facility       Time Tracking:     SLP Treatment Date:   09/05/23  Speech Start Time:  0952  Speech Stop Time:  1009     Speech Total Time (min):  17 min    Billable Minutes: Eval 9  and Total Time 8    09/05/2023

## 2023-09-05 NOTE — NURSING
POC updated and reviewed with the patient/ at the bedside. Questions regarding POC were encouraged and addressed. VSS, see flowsheets. Patient is AOX 3-4 at this time. Tele maintained and  Fall and safety precautions maintained, no signs of injury noted during shift. Patient was repositioned for comfort with bed locked in low position, side rails up x 3, bed alarm yes, with call light within reach. Instructed patient to call staff for mobility, verbalized understanding. Stroke book and education reviewed at the bedside, see education flowsheets for details. No acute signs of distress noted at this time.

## 2023-09-05 NOTE — PROGRESS NOTES
Received a call from Senthil in the MRI Department in relation to this Inpatient needing to have a MRI and has a St Carlo ICD.  Please see information below as it relates to patient's Device being MRI compatible/conditional.  To meet protocol the Pacemaker/ICD must have been implanted no less than 6 weeks prior to scheduled date of Scan.  ICD/PPM and leads must be from same .  MRI informed ordering MD must input a Cardiac Device Check in clinic and hospital order, patient must have an xray within 6 months or less prior to MRI reprogramming.  Chest xray to be reviewed by Radiologist.    St Carlo Quadra Assura [id number]  LV lead:  [id number] is NOT MRI compatible.     This pt is UNABLE to have a MRI. Senthil in MRI notified.

## 2023-09-05 NOTE — SUBJECTIVE & OBJECTIVE
No past medical history on file.  No past surgical history on file.  Review of patient's allergies indicates:  No Known Allergies    Scheduled Medications:    atorvastatin  40 mg Oral Daily    famotidine  20 mg Oral Daily       PRN Medications: acetaminophen, albuterol sulfate, sodium chloride 0.9%, sodium chloride 0.9%    Family History    None       Tobacco Use    Smoking status: Not on file    Smokeless tobacco: Not on file   Substance and Sexual Activity    Alcohol use: Not on file    Drug use: Not on file    Sexual activity: Not on file     Review of Systems   Constitutional:  Positive for activity change.   HENT:  Positive for trouble swallowing.    Musculoskeletal:  Positive for gait problem.   Neurological:  Positive for speech difficulty and weakness.   Psychiatric/Behavioral:  Positive for confusion. Negative for agitation and behavioral problems.      Objective:     Vital Signs (Most Recent):  Temp: 97.8 °F (36.6 °C) (09/05/23 0823)  Pulse: 70 (09/05/23 0823)  Resp: 16 (09/05/23 0823)  BP: 137/77 (09/05/23 0823)  SpO2: 95 % (09/05/23 0823)    Vital Signs (24h Range):  Temp:  [72 °F (22.2 °C)-98.8 °F (37.1 °C)] 97.8 °F (36.6 °C)  Pulse:  [68-85] 70  Resp:  [14-36] 16  SpO2:  [92 %-96 %] 95 %  BP: (123-162)/(75-92) 137/77     Body mass index is 26.16 kg/m².     Physical Exam  Vitals and nursing note reviewed.   HENT:      Nose: Nose normal.      Mouth/Throat:      Mouth: Mucous membranes are moist.   Eyes:      Extraocular Movements: Extraocular movements intact.      Pupils: Pupils are equal, round, and reactive to light.   Pulmonary:      Effort: Pulmonary effort is normal. No respiratory distress.   Musculoskeletal:      Comments: R sided weakness   Skin:     General: Skin is warm.   Neurological:      Mental Status: He is alert.      Motor: Weakness present.      Gait: Gait abnormal.      Comments: Following commands   Cog impairment   Psychiatric:         Mood and Affect: Mood normal.           NEUROLOGICAL EXAMINATION:     CRANIAL NERVES     CN III, IV, VI   Pupils are equal, round, and reactive to light.      Diagnostic Results: Labs: Reviewed  ECG: Reviewed  CT: Reviewed

## 2023-09-05 NOTE — HPI
London Ryder is a 89-year-old male with PMHx of AAA, AICD, and HTN. Patient presented to [facility] on 9/2/23 from [facility] for possible intervention for R MCA occulusion. Patient taken to IR, no successful intervention. CTH post IR with R MCA frontal opercular infarct, possible SAH vs contrast and new R temporal IPH.     Functional History: Patient lives alone. Pt will stay with one daughter after d/c who has a first floor set up for him, no stairs. Prior to admission, I. DME: none.

## 2023-09-05 NOTE — PLAN OF CARE
Problem: Adjustment to Illness (Stroke, Ischemic/Transient Ischemic Attack)  Goal: Optimal Coping  Outcome: Ongoing, Progressing     Problem: Adult Inpatient Plan of Care  Goal: Readiness for Transition of Care  Outcome: Ongoing, Progressing     POC reviewed with the patient and they verbalized understanding. Up in the chair. CT done, waiting for placement.All comments and concerns addressed. Bed locked in lowest position with bed alarm set, call light within reach. Safety precautions maintained. VSS, see flowsheets.  Will continue to monitor for changes to POC and clinical condition.

## 2023-09-05 NOTE — ASSESSMENT & PLAN NOTE
89 yo M with PMHx of AAA, AICD, and HTN presents as a transfer from [facility] for possible intervention. Patient presented to OSH with left sided weakness, facial droop and dysarthria. Reportedly his LKN was 1450. Patient got up to use the bathroom, fell and was found down by family who noticed confusion, LSW and facial droop. At baseline is highly functional and performs all of his ADLs independently. Telestroke with Dr. Delvalle, patient with improving symptoms so no thrombolytic was recommended. Imaging concerning for R MCA occlusion for which patient was transferred to [facility] for possible intervention. Patient taken to IR, no successful intervention TICI0 of R M2. CTH post IR with R MCA frontal opercular infarct, possible SAH vs contrast and new R temporal IPH.     Neuro exam stable. Working with therapy. Recs for IPR. Daughter requesting spacing out of neurochecks and vital checks. Patient is ok for SD. MRI pending once AICD/cardiac device reps are coordinated.       Antithrombotics for secondary stroke prevention: Antiplatelets: None: Intracerebral Hemorrhage     Statins for secondary stroke prevention and hyperlipidemia, if present:   Statins: Atorvastatin- 40 mg daily    Aggressive risk factor modification: HTN, AAA     Rehab efforts: The patient has been evaluated by a stroke team provider and the therapy needs have been fully considered based off the presenting complaints and exam findings. The following therapy evaluations are needed: PT evaluate and treat, OT evaluate and treat, SLP evaluate and treat, PM&R evaluate for appropriate placement    Diagnostics ordered/pending: CT scan of head without contrast to asses brain parenchyma.   VTE prophylaxis: Mechanical prophylaxis: Place SCDs    BP parameters:SBP<160

## 2023-09-05 NOTE — SUBJECTIVE & OBJECTIVE
"Neurologic Chief Complaint:     Subjective:     Interval History: Patient is seen for follow-up neurological assessment and treatment recommendations:   No acute event overnight. Pt reports that he is doing" good".  Pt is moving his 4 extremities, dysarthria and  with left side neglect. Pt continue working with PT   HPI, Past Medical, Family, and Social History remains the same as documented in the initial encounter.     Review of Systems   Constitutional:  Negative for diaphoresis and fever.   HENT:  Negative for nosebleeds and rhinorrhea.    Eyes:  Negative for discharge and redness.   Respiratory:  Negative for cough and shortness of breath.    Cardiovascular:  Negative for chest pain and leg swelling.   Gastrointestinal:  Negative for blood in stool, nausea and vomiting.   Genitourinary:  Negative for difficulty urinating and hematuria.   Neurological:  Negative for facial asymmetry and weakness.     Scheduled Meds:   atorvastatin  40 mg Oral Daily    famotidine  20 mg Oral Daily     Continuous Infusions:  PRN Meds:acetaminophen, albuterol sulfate, sodium chloride 0.9%, sodium chloride 0.9%    Objective:     Vital Signs (Most Recent):  Temp: 97.8 °F (36.6 °C) (09/05/23 0823)  Pulse: 70 (09/05/23 0823)  Resp: 16 (09/05/23 0823)  BP: 137/77 (09/05/23 0823)  SpO2: 95 % (09/05/23 0823)  BP Location: Right arm    Vital Signs Range (Last 24H):  Temp:  [72 °F (22.2 °C)-98.8 °F (37.1 °C)]   Pulse:  [68-85]   Resp:  [14-36]   BP: (116-162)/(68-92)   SpO2:  [92 %-97 %]   BP Location: Right arm       Physical Exam  Vitals reviewed.   Constitutional:       Appearance: Normal appearance.   HENT:      Head: Normocephalic and atraumatic.   Cardiovascular:      Rate and Rhythm: Normal rate and regular rhythm.   Pulmonary:      Effort: Pulmonary effort is normal.   Abdominal:      General: Abdomen is flat.      Palpations: Abdomen is soft.   Neurological:      Mental Status: He is alert.          Neurological Exam:   Language: " Expressive aphasia, Repetition impaired  Articulation: Dysarthria  Orientation: Not oriented to time  Visual Fields: Full  Visual neglect  EOM (CN III, IV, VI): Gaze preference  right  Facial Sensation (CN V): Normal  Facial Movement (CN VII): Symmetric facial expression    Reflexes: 2+ throughout  Motor: Arm left  Paresis: 2/5  Leg left  Paresis: 3/5  Sensation: Peterson-hypoesthesia left    Laboratory:  CMP:   Recent Labs   Lab 09/05/23  0630   CALCIUM 9.4   ALBUMIN 3.5   PROT 6.5   *   K 4.4   CO2 20*      BUN 18   CREATININE 1.0   ALKPHOS 81   ALT 13   AST 28   BILITOT 1.4*     CBC:   Recent Labs   Lab 09/04/23  0037   WBC 6.60   RBC 4.14*   HGB 13.2*   HCT 40.3      MCV 97   MCH 31.9*   MCHC 32.8     Lipid Panel:   Recent Labs   Lab 09/03/23  0027   CHOL 162   LDLCALC 101.6   HDL 40   TRIG 102     Coagulation:   Recent Labs   Lab 09/03/23  0027   INR 1.0   APTT 31.6     Hgb A1C:   Recent Labs   Lab 09/03/23  0027   HGBA1C 5.4     TSH:   Recent Labs   Lab 09/03/23  0027   TSH 0.273*       Diagnostic Results     Brain/Vessel Imaging   CTH WO 9/3/2023 1218  Stable appearance of the brain with recent right MCA infarct.  Stable intracranial hemorrhage.  No midline shift.     CTH WO 9/3/2023 0805  Right MCA frontal opercular infarct.  Prominent subarachnoid hemorrhage/contrast.  New right temporal lobe intraparenchymal hemorrhage with trace intraventricular hemorrhage.  No significant midline shift     CTH WO 9/2/2023  Moderate subarachnoid hemorrhage in the sylvian fissure and in the gyri of the temporoparietal lobe on the right.     Residual contrast within the [id number] vessels from previous angiogram.     No evidence of intraparenchymal or interventricular hemorrhage.     No evidence of cortical infarct.     IR angio 9/2/2023  1.    Right M2 LVO s/p direct catheter aspiration x4 & mechanical thrombectomy x1 (TICI 0)  2.    Otherwise normal cerebral angiogram.     CTA at OSH per paper read  with R MCA occlusion     Cardiac Imaging   Echo 9/3/2023    Left Ventricle: The left ventricle is normal in size. Increased wall thickness. There is concentric remodeling. Normal wall motion. There is normal systolic function with a visually estimated ejection fraction of 55 - 60%. There is indeterminate diastolic function.    Right Ventricle: Normal right ventricular cavity size. Right ventricle wall motion  is normal. Systolic function is normal.    Left Atrium: Left atrium is severely dilated. Agitated saline study of the atrial septum is negative, suggesting absence of intracardiac shunt at the atrial level.    Aortic Valve: There is moderate aortic valve sclerosis. Moderately restricted motion. There is mild stenosis. Aortic valve area by VTI is 2.07 cm². Aortic valve peak velocity is 2.13 m/s. Mean gradient is 12 mmHg. The dimensionless index is 0.35. There is mild aortic regurgitation.    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 There is mild to moderate regurgitation with multiple jets.    Pulmonary Artery: The estimated pulmonary artery systolic pressure is 35 mmHg.    IVC/SVC: Intermediate venous pressure at 8 mmHg.    Aorta: Aortic root is mildly dilated measuring 4.06 cm. Ascending aorta is mildly dilated measuring 4.29 cm.

## 2023-09-06 VITALS
HEIGHT: 67 IN | BODY MASS INDEX: 26.21 KG/M2 | DIASTOLIC BLOOD PRESSURE: 83 MMHG | HEART RATE: 85 BPM | RESPIRATION RATE: 18 BRPM | TEMPERATURE: 97 F | SYSTOLIC BLOOD PRESSURE: 148 MMHG | WEIGHT: 167 LBS | OXYGEN SATURATION: 95 %

## 2023-09-06 LAB
ALBUMIN SERPL BCP-MCNC: 3.6 G/DL (ref 3.5–5.2)
ALP SERPL-CCNC: 94 U/L (ref 55–135)
ALT SERPL W/O P-5'-P-CCNC: 12 U/L (ref 10–44)
ANION GAP SERPL CALC-SCNC: 12 MMOL/L (ref 8–16)
AST SERPL-CCNC: 28 U/L (ref 10–40)
BASOPHILS # BLD AUTO: 0.04 K/UL (ref 0–0.2)
BASOPHILS NFR BLD: 0.5 % (ref 0–1.9)
BILIRUB SERPL-MCNC: 1.1 MG/DL (ref 0.1–1)
BUN SERPL-MCNC: 22 MG/DL (ref 10–30)
CALCIUM SERPL-MCNC: 9.3 MG/DL (ref 8.7–10.5)
CHLORIDE SERPL-SCNC: 106 MMOL/L (ref 95–110)
CO2 SERPL-SCNC: 19 MMOL/L (ref 23–29)
CREAT SERPL-MCNC: 1.1 MG/DL (ref 0.5–1.4)
DIFFERENTIAL METHOD: ABNORMAL
EOSINOPHIL # BLD AUTO: 0.3 K/UL (ref 0–0.5)
EOSINOPHIL NFR BLD: 3.4 % (ref 0–8)
ERYTHROCYTE [DISTWIDTH] IN BLOOD BY AUTOMATED COUNT: 13.8 % (ref 11.5–14.5)
EST. GFR  (NO RACE VARIABLE): >60 ML/MIN/1.73 M^2
GLUCOSE SERPL-MCNC: 90 MG/DL (ref 70–110)
HCT VFR BLD AUTO: 42.8 % (ref 40–54)
HGB BLD-MCNC: 14.1 G/DL (ref 14–18)
IMM GRANULOCYTES # BLD AUTO: 0.04 K/UL (ref 0–0.04)
IMM GRANULOCYTES NFR BLD AUTO: 0.5 % (ref 0–0.5)
LYMPHOCYTES # BLD AUTO: 1.2 K/UL (ref 1–4.8)
LYMPHOCYTES NFR BLD: 14.9 % (ref 18–48)
MAGNESIUM SERPL-MCNC: 2 MG/DL (ref 1.6–2.6)
MCH RBC QN AUTO: 32.2 PG (ref 27–31)
MCHC RBC AUTO-ENTMCNC: 32.9 G/DL (ref 32–36)
MCV RBC AUTO: 98 FL (ref 82–98)
MONOCYTES # BLD AUTO: 0.8 K/UL (ref 0.3–1)
MONOCYTES NFR BLD: 9.6 % (ref 4–15)
NEUTROPHILS # BLD AUTO: 5.6 K/UL (ref 1.8–7.7)
NEUTROPHILS NFR BLD: 71.1 % (ref 38–73)
NRBC BLD-RTO: 0 /100 WBC
PHOSPHATE SERPL-MCNC: 2.9 MG/DL (ref 2.7–4.5)
PLATELET # BLD AUTO: 147 K/UL (ref 150–450)
PMV BLD AUTO: 10.5 FL (ref 9.2–12.9)
POTASSIUM SERPL-SCNC: 4.2 MMOL/L (ref 3.5–5.1)
PROT SERPL-MCNC: 6.9 G/DL (ref 6–8.4)
RBC # BLD AUTO: 4.38 M/UL (ref 4.6–6.2)
SODIUM SERPL-SCNC: 137 MMOL/L (ref 136–145)
WBC # BLD AUTO: 7.91 K/UL (ref 3.9–12.7)

## 2023-09-06 PROCEDURE — 97116 GAIT TRAINING THERAPY: CPT

## 2023-09-06 PROCEDURE — 80053 COMPREHEN METABOLIC PANEL: CPT

## 2023-09-06 PROCEDURE — 36415 COLL VENOUS BLD VENIPUNCTURE: CPT

## 2023-09-06 PROCEDURE — 84100 ASSAY OF PHOSPHORUS: CPT

## 2023-09-06 PROCEDURE — 99238 PR HOSPITAL DISCHARGE DAY,<30 MIN: ICD-10-PCS | Mod: ,,, | Performed by: PSYCHIATRY & NEUROLOGY

## 2023-09-06 PROCEDURE — 25000003 PHARM REV CODE 250

## 2023-09-06 PROCEDURE — 99238 HOSP IP/OBS DSCHRG MGMT 30/<: CPT | Mod: ,,, | Performed by: PSYCHIATRY & NEUROLOGY

## 2023-09-06 PROCEDURE — 92507 TX SP LANG VOICE COMM INDIV: CPT

## 2023-09-06 PROCEDURE — 83735 ASSAY OF MAGNESIUM: CPT

## 2023-09-06 PROCEDURE — 97535 SELF CARE MNGMENT TRAINING: CPT

## 2023-09-06 PROCEDURE — 97530 THERAPEUTIC ACTIVITIES: CPT

## 2023-09-06 PROCEDURE — 85025 COMPLETE CBC W/AUTO DIFF WBC: CPT

## 2023-09-06 RX ORDER — FAMOTIDINE 20 MG/1
20 TABLET, FILM COATED ORAL DAILY
Qty: 30 TABLET | Refills: 11
Start: 2023-09-07 | End: 2024-09-06

## 2023-09-06 RX ORDER — ATORVASTATIN CALCIUM 40 MG/1
40 TABLET, FILM COATED ORAL DAILY
Qty: 90 TABLET | Refills: 3
Start: 2023-09-07 | End: 2024-09-06

## 2023-09-06 RX ADMIN — ATORVASTATIN CALCIUM 40 MG: 40 TABLET, FILM COATED ORAL at 08:09

## 2023-09-06 RX ADMIN — FAMOTIDINE 20 MG: 20 TABLET ORAL at 08:09

## 2023-09-06 NOTE — PLAN OF CARE
Problem: Adult Inpatient Plan of Care  Goal: Plan of Care Review  Outcome: Ongoing, Progressing  Goal: Patient-Specific Goal (Individualized)  Outcome: Ongoing, Progressing  Goal: Absence of Hospital-Acquired Illness or Injury  Outcome: Ongoing, Progressing  Goal: Optimal Comfort and Wellbeing  Outcome: Ongoing, Progressing  Goal: Readiness for Transition of Care  Outcome: Ongoing, Progressing     Problem: Adjustment to Illness (Stroke, Ischemic/Transient Ischemic Attack)  Goal: Optimal Coping  Outcome: Ongoing, Progressing     Problem: Bowel Elimination Impaired (Stroke, Ischemic/Transient Ischemic Attack)  Goal: Effective Bowel Elimination  Outcome: Ongoing, Progressing     Problem: Cerebral Tissue Perfusion (Stroke, Ischemic/Transient Ischemic Attack)  Goal: Optimal Cerebral Tissue Perfusion  Outcome: Ongoing, Progressing     Problem: Cognitive Impairment (Stroke, Ischemic/Transient Ischemic Attack)  Goal: Optimal Cognitive Function  Outcome: Ongoing, Progressing     Problem: Communication Impairment (Stroke, Ischemic/Transient Ischemic Attack)  Goal: Improved Communication Skills  Outcome: Ongoing, Progressing     Problem: Functional Ability Impaired (Stroke, Ischemic/Transient Ischemic Attack)  Goal: Optimal Functional Ability  Outcome: Ongoing, Progressing     Problem: Respiratory Compromise (Stroke, Ischemic/Transient Ischemic Attack)  Goal: Effective Oxygenation and Ventilation  Outcome: Ongoing, Progressing     Problem: Sensorimotor Impairment (Stroke, Ischemic/Transient Ischemic Attack)  Goal: Improved Sensorimotor Function  Outcome: Ongoing, Progressing     Problem: Swallowing Impairment (Stroke, Ischemic/Transient Ischemic Attack)  Goal: Optimal Eating and Swallowing without Aspiration  Outcome: Ongoing, Progressing     Problem: Urinary Elimination Impaired (Stroke, Ischemic/Transient Ischemic Attack)  Goal: Effective Urinary Elimination  Outcome: Ongoing, Progressing     Problem: Fall Injury  Risk  Goal: Absence of Fall and Fall-Related Injury  Outcome: Ongoing, Progressing     Problem: Skin Injury Risk Increased  Goal: Skin Health and Integrity  Outcome: Ongoing, Progressing     Problem: Impaired Wound Healing  Goal: Optimal Wound Healing  Outcome: Ongoing, Progressing     Problem: Infection  Goal: Absence of Infection Signs and Symptoms  Outcome: Ongoing, Progressing     Personal sitter at the bedside. No c/o pain. VSS. No neuro changes, no acute events. Will continue monitor.

## 2023-09-06 NOTE — PT/OT/SLP PROGRESS
Physical Therapy Treatment    Patient Name:  London Ryder   MRN:  [id number]    Recommendations:     Discharge Recommendations: rehabilitation facility  Discharge Equipment Recommendations: to be determined by next level of care  Barriers to discharge: Inaccessible home and Decreased caregiver support    Assessment:     London Ryder is a 89 y.o. 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  He presents with the following impairments/functional limitations: weakness, impaired endurance, impaired self care skills, impaired functional mobility, gait instability, impaired balance, impaired cognition, visual deficits, decreased coordination, decreased upper extremity function, decreased lower extremity function, decreased safety awareness. Pt w/ improved functional mobility and ambulation, requiring Aurelio to ambulate 50' w/ RW. Rec d/c to IPR for continued treatment in order to maximize functional return as pt w/ high motivation, good activity tolerance, and w/ potential for significant improvements in functional mobility.      Rehab Prognosis: Good; patient would benefit from acute skilled PT services to address these deficits and reach maximum level of function.    Recent Surgery: Procedure(s) (LRB):  ANGIOGRAM-CEREBRAL (N/A)      Plan:     During this hospitalization, patient to be seen 4 x/week to address the identified rehab impairments via gait training, therapeutic activities, therapeutic exercises, neuromuscular re-education and progress toward the following goals:    Plan of Care Expires:  10/03/23    Subjective     Chief Complaint: soiled  Patient/Family Comments/goals: pt family hoping to get him to rehab today  Pain/Comfort:  Pain Rating 1: 0/10  Pain Rating Post-Intervention 1: 0/10      Objective:     Communicated with RN prior to session.  Patient found up in chair with telemetry upon PT entry to room.     General Precautions: Standard, aspiration,  fall  Orthopedic Precautions: N/A  Braces: N/A  Respiratory Status: Room air     Functional Mobility:  Transfers:     Sit to Stand:  minimum assistance with rolling walker  Gait: 4x 50' w/ RW Aurelio      AM-PAC 6 CLICK MOBILITY  Turning over in bed (including adjusting bedclothes, sheets and blankets)?: 3  Sitting down on and standing up from a chair with arms (e.g., wheelchair, bedside commode, etc.): 3  Moving from lying on back to sitting on the side of the bed?: 3  Moving to and from a bed to a chair (including a wheelchair)?: 3  Need to walk in hospital room?: 3  Climbing 3-5 steps with a railing?: 2  Basic Mobility Total Score: 17       Treatment & Education:  Pt educated on PT POC/goals, d/c recs, and continued treatment. All questions answered and pt in agreement w/ POC.  Gait training w/ cueing for proper use of AD, step length, postural control, safety awareness, obstacle avoidance, width of EMMETT, and proximity to AD  Pt assisted w/ doffing gown and donning shirt, shorts, and socks w/ maxA      Patient left up in chair with all lines intact, call button in reach, RN notified, and family and SLP present..    GOALS:   Multidisciplinary Problems       Physical Therapy Goals          Problem: Physical Therapy    Goal Priority Disciplines Outcome Goal Variances Interventions   Physical Therapy Goal     PT, PT/OT Ongoing, Progressing     Description: Goals to be met by: 23     Patient will increase functional independence with mobility by performin. Supine to sit with Contact Guard Assistance  2. Sit to supine with Contact Guard Assistance  3. Sit to stand transfer with Contact Guard Assistance  4. Bed to chair transfer with Contact Guard Assistance using LRAD  5. Gait  x 100 feet with Contact Guard Assistance using LRAD.                          Time Tracking:     PT Received On: 23  PT Start Time: 1250     PT Stop Time: 1329  PT Total Time (min): 39 min     Billable Minutes: Gait Training 24 and  Therapeutic Activity 15    Treatment Type: Treatment  PT/PTA: PT     Number of PTA visits since last PT visit: 0     09/06/2023

## 2023-09-06 NOTE — SUBJECTIVE & OBJECTIVE
Neurologic Chief Complaint: R MCA stroke    Subjective:     Interval History: Patient is seen for follow-up neurological assessment and treatment recommendations:   NAEO, neuro exam stable. Repeat CTH yesterday stable. Dispo recs for IPR and placement pending, anticipate discharge possibly today or tomorrow.     HPI, Past Medical, Family, and Social History remains the same as documented in the initial encounter.     Review of Systems   Constitutional:  Negative for fever.   HENT:  Negative for drooling and trouble swallowing.    Eyes:  Positive for visual disturbance.   Respiratory:  Negative for shortness of breath.    Cardiovascular:  Negative for chest pain.   Gastrointestinal:  Negative for nausea and vomiting.   Neurological:  Positive for facial asymmetry and speech difficulty. Negative for weakness.     Scheduled Meds:   atorvastatin  40 mg Oral Daily    famotidine  20 mg Oral Daily     Continuous Infusions:  PRN Meds:acetaminophen, albuterol sulfate, sodium chloride 0.9%, sodium chloride 0.9%    Objective:     Vital Signs (Most Recent):  Temp: 97 °F (36.1 °C) (09/06/23 1055)  Pulse: 82 (09/06/23 1055)  Resp: 18 (09/06/23 1055)  BP: (!) 148/83 (09/06/23 1055)  SpO2: 95 % (09/06/23 1055)  BP Location: Left arm    Vital Signs Range (Last 24H):  Temp:  [97 °F (36.1 °C)-97.9 °F (36.6 °C)]   Pulse:  []   Resp:  [16-18]   BP: (108-148)/(72-83)   SpO2:  [94 %-97 %]   BP Location: Left arm       Physical Exam  Vitals and nursing note reviewed.   Constitutional:       General: He is sleeping. He is not in acute distress.  HENT:      Head: Normocephalic.      Nose: Nose normal.      Mouth/Throat:      Mouth: Mucous membranes are moist.   Eyes:      General: Visual field deficit present.      Conjunctiva/sclera: Conjunctivae normal.   Cardiovascular:      Rate and Rhythm: Normal rate.   Pulmonary:      Effort: Pulmonary effort is normal. No respiratory distress.   Abdominal:      General: There is no distension.    Musculoskeletal:         General: No deformity.      Cervical back: No rigidity.   Skin:     General: Skin is warm.   Neurological:      Cranial Nerves: Dysarthria and facial asymmetry present.      Sensory: No sensory deficit.      Motor: No weakness.          Neurological Exam:   LOC: sleepy, easily arouses  Attention Span: Good   Language: Expressive aphasia  Articulation: Dysarthria  Orientation: Not oriented to time  Visual Fields: Visual neglect  EOM (CN III, IV, VI): Gaze preference  right  Facial Movement (CN VII): Lower facial weakness on the Left  Motor: moves all extremities AG  Sensation: Intact to light touch    Laboratory:  CMP:   Recent Labs   Lab 09/06/23 0523   CALCIUM 9.3   ALBUMIN 3.6   PROT 6.9      K 4.2   CO2 19*      BUN 22   CREATININE 1.1   ALKPHOS 94   ALT 12   AST 28   BILITOT 1.1*       CBC:   Recent Labs   Lab 09/06/23 0523   WBC 7.91   RBC 4.38*   HGB 14.1   HCT 42.8   *   MCV 98   MCH 32.2*   MCHC 32.9       Lipid Panel:   Recent Labs   Lab 09/03/23 0027   CHOL 162   LDLCALC 101.6   HDL 40   TRIG 102       Coagulation:   Recent Labs   Lab 09/03/23 0027   INR 1.0   APTT 31.6       Hgb A1C:   Recent Labs   Lab 09/03/23 0027   HGBA1C 5.4       TSH:   Recent Labs   Lab 09/03/23 0027   TSH 0.273*         Diagnostic Results     Brain/Vessel Imaging   CTH without contrast 9/5/2023  Impression:  Evolving region of right MCA territory infarction with superimposed parenchymal hemorrhage.  Scattered subarachnoid and intraventricular hemorrhage relatively similar  No significant new hemorrhage or new abnormal parenchymal attenuation  Ventricles stable without evidence for hydrocephalus  Clinical correlation and close follow-up recommended.    CTH without contrast 9/3/2023 1218  Impression:  Stable appearance of the brain with recent right MCA infarct.  Stable intracranial hemorrhage.  No midline shift.     CTH without contrast 9/3/2023 0805  Impression:  Right MCA  frontal opercular infarct.  Prominent subarachnoid hemorrhage/contrast.  New right temporal lobe intraparenchymal hemorrhage with trace intraventricular hemorrhage.  No significant midline shift     CTH without contrast 9/2/2023  Impression:  Moderate subarachnoid hemorrhage in the sylvian fissure and in the gyri of the temporoparietal lobe on the right.  Residual contrast within the [id number] vessels from previous angiogram.  No evidence of intraparenchymal or interventricular hemorrhage.  No evidence of cortical infarct.      CTA at OSH per paper read with R MCA occlusion      Vessel Imaging  IR Cerebral Angiogram 9/2/2023  Impression:  Cerebral angiogram demonstrated right M2 occlusion.  Numerous attempts at thrombectomy were performed, but only minimal reperfusion was achieved.  No complications were noted at the end of the procedure. There is still collateral flow to this territory provided by CABRERA leptomeningeal branches.  There was TICI 2A reperfusion.       Cardiac Imaging   TTE with bubble 9/3/2023    Left Ventricle: The left ventricle is normal in size. Increased wall thickness. There is concentric remodeling. Normal wall motion. There is normal systolic function with a visually estimated ejection fraction of 55 - 60%. There is indeterminate diastolic function.    Right Ventricle: Normal right ventricular cavity size. Right ventricle wall motion  is normal. Systolic function is normal.    Left Atrium: Left atrium is severely dilated. Agitated saline study of the atrial septum is negative, suggesting absence of intracardiac shunt at the atrial level.    Aortic Valve: There is moderate aortic valve sclerosis. Moderately restricted motion. There is mild stenosis. Aortic valve area by VTI is 2.07 cm². Aortic valve peak velocity is 2.13 m/s. Mean gradient is 12 mmHg. The dimensionless index is 0.35. There is mild aortic regurgitation.    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 There is mild to moderate  regurgitation with multiple jets.    Pulmonary Artery: The estimated pulmonary artery systolic pressure is 35 mmHg.    IVC/SVC: Intermediate venous pressure at 8 mmHg.    Aorta: Aortic root is mildly dilated measuring 4.06 cm. Ascending aorta is mildly dilated measuring 4.29 cm.

## 2023-09-06 NOTE — DISCHARGE SUMMARY
Charan Qureshi - Neurosurgery ([facility])  Vascular Neurology  Comprehensive Stroke Center  Discharge Summary     Summary:     Admit Date: 9/2/2023  8:23 PM    Discharge Date and Time: 9/6/2023  3:22 PM    Attending Physician: Daniel Dai MD     Discharge Provider: RILEY Hudson    History of Present Illness: 89 yo M with PMHx of AAA, AICD, and HTN presents as a transfer from [facility] for possible intervention. Patient presented to OSH with left sided weakness, facial droop and dysarthria. Reportedly his LKN was 1450. Patient got up to use the bathroom, fell and was found down by family who noticed confusion, LSW and facial droop. At baseline is highly functional and performs all of his ADLs independently. Telestroke with Dr. Delvalle, patient with improving symptoms so no thrombolytic was recommended. Imaging concerning for R MCA occlusion for which patient was transferred to [facility] for possible intervention. On arrival to [facility], patient with left facial droop but no current left sided weakness. Daughter at the bedside reports his symptoms have significantly improved and besides the current left facial droop, he is at his baseline speech and moving everything as normal.       Hospital Course (synopsis of major diagnoses, care, treatment, and services provided during the course of the hospital stay):   9/3/2023 Patient was taken to IR yesterday, no successful intervention TICI0 of R M2. CTH post IR with R MCA frontal opercular infarct, possible SAH vs contrast and new R temporal IPH. Exam notable for R gaze(crosses midline), LUE/LLE drift, dysarthria, L hemihypoesthesia and L neglect. Recommend holding ASA in setting of possible new IPH. Will continue to follow.   9/4/2023 Neuro exam stable. Working with therapy. Recs for IPR. Daughter requesting spacing out of neurochecks and vital checks. Patient is ok for SD.   9/5/2023 mild dysarthria, left sided hemiparesis, left neglect and no drift.  Neuro exam stable.  ECHO was done with EF 55-60%, and LA severely dilated.  Pt has a pacemaker which incompatible with MRI.  Plan to repeat CT head today.  09/06/2023 NAEO, neuro exam stable. Repeat CTH yesterday stable. Dispo recs for IPR and placement pending, anticipate discharge possibly today or tomorrow.      Goals of Care Treatment Preferences:  Code Status: DNR      Stroke Etiology: Ischemic Undetermined Cause Cryptogenic Embolism / ESUS (Embolic Stroke of Undetermined Source)    STROKE DOCUMENTATION   Acute Stroke Times   Last Known Normal Date: 09/02/23  Last Known Normal Time: 1450  Symptom Onset Date: 09/02/23  Symptom Onset Time: 1450  Stroke Team Called Date: 09/02/23  Stroke Team Called Time: 2024  Stroke Team Arrival Date: 09/02/23  Stroke Team Arrival Time: 2025  CT Interpretation Time:  (done at OSH, none done on arrival)  Thrombolytic Therapy Recommended: No  Thrombectomy Recommended: Yes  Decision to Treat Time for IR:  (decision made by IR team prior to arrival around 1934)     NIH Scale:  1a. Level of Consciousness: 1-->Not alert, but arousable by minor stimulation to obey, answer, or respond  1b. LOC Questions: 0-->Answers both questions correctly  1c. LOC Commands: 0-->Performs both tasks correctly  2. Best Gaze: 1-->Partial gaze palsy, gaze is abnormal in one or both eyes, but forced deviation or total gaze paresis is not present  3. Visual: 2-->Complete hemianopia  4. Facial Palsy: 1-->Minor paralysis (flattened nasolabial fold, asymmetry on smiling)  5a. Motor Arm, Left: 0-->No drift, limb holds 90 (or 45) degrees for full 10 secs  5b. Motor Arm, Right: 0-->No drift, limb holds 90 (or 45) degrees for full 10 secs  6a. Motor Leg, Left: 0-->No drift, leg holds 30 degree position for full 5 secs  6b. Motor Leg, Right: 0-->No drift, leg holds 30 degree position for full 5 secs  7. Limb Ataxia: 0-->Absent  8. Sensory: 0-->Normal, no sensory loss  9. Best Language: 1-->Mild-to-moderate aphasia, some obvious loss of  fluency or facility of comprehension, without significant limitation on ideas expressed or form of expression. Reduction of speech and/or comprehension, however, makes conversation. . . (see row details)  10. Dysarthria: 1-->Mild-to-moderate dysarthria, patient slurs at least some words and, at worst, can be understood with some difficulty  11. Extinction and Inattention (formerly Neglect): 0-->No abnormality  Total (NIH Stroke Scale): 7        Modified Broward Score: 4  Sutton Coma Scale:    ABCD2 Score:    [id number] Score:   HAS -BLED Score:   ICH Score:   Hunt & Danielle Classification:       Assessment/Plan:     Diagnostic Results:     Brain Imaging   CTH without contrast 9/5/2023  Impression:  Evolving region of right MCA territory infarction with superimposed parenchymal hemorrhage.  Scattered subarachnoid and intraventricular hemorrhage relatively similar  No significant new hemorrhage or new abnormal parenchymal attenuation  Ventricles stable without evidence for hydrocephalus  Clinical correlation and close follow-up recommended.     CTH without contrast 9/3/2023 1218  Impression:  Stable appearance of the brain with recent right MCA infarct.  Stable intracranial hemorrhage.  No midline shift.     CTH without contrast 9/3/2023 0805  Impression:  Right MCA frontal opercular infarct.  Prominent subarachnoid hemorrhage/contrast.  New right temporal lobe intraparenchymal hemorrhage with trace intraventricular hemorrhage.  No significant midline shift     CTH without contrast 9/2/2023  Impression:  Moderate subarachnoid hemorrhage in the sylvian fissure and in the gyri of the temporoparietal lobe on the right.  Residual contrast within the lecwhd-gh-Glhnyd vessels from previous angiogram.  No evidence of intraparenchymal or interventricular hemorrhage.  No evidence of cortical infarct.      CTA at OSH per paper read with R MCA occlusion        Vessel Imaging  IR Cerebral Angiogram 9/2/2023  Impression:  Cerebral  angiogram demonstrated right M2 occlusion.  Numerous attempts at thrombectomy were performed, but only minimal reperfusion was achieved.  No complications were noted at the end of the procedure. There is still collateral flow to this territory provided by CABRERA leptomeningeal branches.  There was TICI 2A reperfusion.        Cardiac Imaging   TTE with bubble 9/3/2023    Left Ventricle: The left ventricle is normal in size. Increased wall thickness. There is concentric remodeling. Normal wall motion. There is normal systolic function with a visually estimated ejection fraction of 55 - 60%. There is indeterminate diastolic function.    Right Ventricle: Normal right ventricular cavity size. Right ventricle wall motion  is normal. Systolic function is normal.    Left Atrium: Left atrium is severely dilated. Agitated saline study of the atrial septum is negative, suggesting absence of intracardiac shunt at the atrial level.    Aortic Valve: There is moderate aortic valve sclerosis. Moderately restricted motion. There is mild stenosis. Aortic valve area by VTI is 2.07 cm². Aortic valve peak velocity is 2.13 m/s. Mean gradient is 12 mmHg. The dimensionless index is 0.35. There is mild aortic regurgitation.    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 There is mild to moderate regurgitation with multiple jets.    Pulmonary Artery: The estimated pulmonary artery systolic pressure is 35 mmHg.    IVC/SVC: Intermediate venous pressure at 8 mmHg.    Aorta: Aortic root is mildly dilated measuring 4.06 cm. Ascending aorta is mildly dilated measuring 4.29 cm.    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 [I63.411] 09/02/2023 Yes    Intraparenchymal hemorrhage of brain [I61.9] 09/03/2023 Yes    Primary hypertension [I10] 09/02/2023 Yes    Cytotoxic brain edema  [G93.6] 09/02/2023 Yes    Left-sided weakness [R53.1] 09/02/2023 Yes    AICD (automatic cardioverter/defibrillator) present [Z95.810] 09/02/2023 Yes    Abdominal aortic aneurysm (AAA) without rupture [I71.40] 09/02/2023 Yes      Problems Resolved During this Admission:         Recommendations:     Post-discharge complication risks: Falls, Pneumonia    Stroke Education given to: patient, family and caregiver    Follow-up in Stroke Clinic in 4-6 weeks.     Discharge Plan:  Antithrombotics: None. Contraindicated due to  ICH/SAH s/p DSA  Statin: Atorvastatin 40mg  Aggresive risk factor modification:  Hypertension, Diet, Exercise    Follow Up:   Follow-up Information       [facility] VASCULAR NEUROLOGY Follow up in 1 month(s).    Specialty: Vascular Neurology  Why: Hospital follow up  Contact information:  Ivelisse Qureshi  [facility] [address]  [phone]                           Patient Instructions:      Ambulatory referral/consult to Vascular Neurology   Standing Status: Future   Referral Priority: Routine Referral Type: Consultation   Referral Reason: Specialty Services Required   Requested Specialty: Vascular Neurology   Number of Visits Requested: 1     Call 911 for any of the following:   Order Comments: Call 911  right away if any of the following warning signs come on suddenly, even if the symptoms only last for a few minutes. With stroke, timing is very important.   - Warning Signs of Stroke:  - Weakness: You may feel a sudden weakness, tingling or loss of feeling on one side of your face or body.  - Vision Problems: You may have sudden double vision or trouble seeing in one or both eyes.  - Speech Problems: You may have sudden trouble talking, slured speech, or problems understanding others.  - Headache: You may have sudden, severe headache.  - Movement Problems: You may experience dizziness, a feeling of spinning, a loss of balance, a feeling of falling or blackouts.     Activity as tolerated      Cardiac event monitor   Standing Status: Future Standing Exp. Date: 09/06/24   Order Comments: Once discharged from rehab facility     Order Specific Question Answer Comments   Cardiac Event Monitor Auto Trigger    Release to patient Immediate        Medications:  Reconciled Home Medications:      Medication List        START taking these medications      atorvastatin 40 MG tablet  Commonly known as: LIPITOR  Take 1 tablet (40 mg total) by mouth once daily.  Start taking on: September 7, 2023     famotidine 20 MG tablet  Commonly known as: PEPCID  Take 1 tablet (20 mg total) by mouth once daily.  Start taking on: September 7, 2023              RILEY Hudson  [facility]  Department of Vascular Neurology   [facility] Neuro[facility])

## 2023-09-06 NOTE — CONSULTS
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    Social History     Socioeconomic History    Marital status:      Social Determinants of Health     Financial Resource Strain: Low Risk  (9/3/2023)    Overall Financial Resource Strain (CARDIA)     Difficulty of Paying Living Expenses: Not hard at all   Food Insecurity: No Food Insecurity (9/3/2023)    Hunger Vital Sign     Worried About Running Out of Food in the Last Year: Never true     Ran Out of Food in the Last Year: Never true   Transportation Needs: No Transportation Needs (9/3/2023)    PRAPARE - Transportation     Lack of Transportation (Medical): No     Lack of Transportation (Non-Medical): No   Physical Activity: Insufficiently Active (9/3/2023)    Exercise Vital Sign     Days of Exercise per Week: 3 days     Minutes of Exercise per Session: 10 min   Stress: No Stress Concern Present (9/3/2023)    [employer] - Occupational Stress Questionnaire     Feeling of Stress : Only a little   Social Connections: Unknown (9/3/2023)    Social Connection and Isolation Panel [NHANES]     Frequency of Communication with Friends and Family: Three times a week     Frequency of Social Gatherings with Friends and Family: Once a week     Attends Holiness Services: Patient refused     Active Member of Clubs or Organizations: Patient refused     Attends Club or Organization Meetings: Patient refused   Housing Stability: Low Risk  (9/3/2023)    Housing Stability Vital Sign     Unable to Pay for Housing in the Last Year: No     Number of Places Lived in the Last Year: 1     Unstable Housing in the Last Year: No       Precautions:     Allergies as of 09/02/2023    (No Known Allergies)       WO Assessment Details/Treatment     Patient seen for wound care  consultation for EUGENIO.   Reviewed chart for this encounter.   See Flow Sheet for findings.    Pt found sitting up in chair w/ family at bedside, agreeable to care at this time. Foam dressings to L lateral arm removed w/ use of adhesive remover spray. Pt has x2 areas of partial thickness skin loss, wound cleansed w/ NS before re approximating attached skin flaps as much as possible. Xeroform gauze placed to wound bed followed by island boarder dressing. Family states pt fell at home and hit his arm, causing skin tears.    RECOMMENDATIONS:  Q3 days - skin tears - cleanse w/ NS, pat dry. Apply xeroform gauze to wound bed and secure w/ Island boarder dressing.    Discussed POC with patient and primary nurse.   See EMR for orders & patient education.      Nursing to continue care.  Nursing to maintain pressure injury prevention interventions.           09/06/23 1045   WOCN Assessment   WOCN Total Time (mins) 30   Visit Date 09/06/23   Visit Time 1045   Consult Type New   WOCN Speciality Wound   Intervention assessed;changed;applied;chart review;coordination of care;orders   Teaching on-going        Altered Skin Integrity 09/02/23 2332 Left lower;posterior;lateral Arm #3 Abrasion(s) Partial thickness tissue loss. Shallow open ulcer with a red or pink wound bed, without slough. Intact or Open/Ruptured Serum-filled blister.   Date First Assessed/Time First Assessed: 09/02/23 2332   Altered Skin Integrity Present on Admission - Did Patient arrive to the hospital with altered skin?: yes  Side: Left  Orientation: lower;posterior;lateral  Location: Arm  Wound Number: #3  Is th...   Description of Altered Skin Integrity Partial thickness tissue loss. Shallow open ulcer with a red or pink wound bed, without slough. Intact or Open/Ruptured Serum-filled blister.   Dressing Appearance Intact;Moist drainage   Drainage Amount Small   Drainage Characteristics/Odor Serosanguineous   Appearance Pink;Red;Maroon;Moist   Tissue loss  description Partial thickness   Periwound Area Intact;Ecchymotic   Wound Edges Irregular;Open   Care Cleansed with:;Sterile normal saline   Dressing Applied;Non-adherent;Island/border

## 2023-09-06 NOTE — PLAN OF CARE
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       Allergies:Review of patient's allergies indicates:  No Known Allergies    Diet: soft diet and fluid consistency thin    Activities: Activity as tolerated and Up with assistance    Goals of Care Treatment Preferences:  Code Status: DNR      Nursing: Per facility protocol    Labs: Per facility protocol      CONSULTS:    Physical Therapy to evaluate and treat. , Occupational Therapy to evaluate and treat., and Speech Therapy to evaluate and treat for Language, Swallowing, and Cognition.    MISCELLANEOUS CARE:  N/A    WOUND CARE ORDERS  RECOMMENDATIONS:  Q3 days - skin tears - cleanse w/ NS, pat dry. Apply xeroform gauze to wound bed and secure w/ Island boarder dressing.  Nursing to continue care.  Nursing to maintain pressure injury prevention interventions.    Medications: Review discharge medications with patient and family and provide education.      Current Discharge Medication List        START taking these medications    Details   atorvastatin (LIPITOR) 40 MG tablet Take 1 tablet (40 mg total) by mouth once daily.  Qty: 90 tablet, Refills: 3      famotidine (PEPCID) 20 MG tablet Take 1 tablet (20 mg total) by mouth once daily.  Qty: 30  tablet, Refills: 11                Immunizations Administered as of 9/6/2023       No immunizations on file.              Some patients may experience side effects after vaccination.  These may include fever, headache, muscle or joint aches.  Most symptoms resolve with 24-48 hours and do not require urgent medical evaluation unless they persist for more than 72 hours or symptoms are concerning for an unrelated medical condition.            _________________________________  RILEY Hudson  09/06/2023

## 2023-09-06 NOTE — PLAN OF CARE
Problem: Adult Inpatient Plan of Care  Goal: Optimal Comfort and Wellbeing  Outcome: Ongoing, Progressing     Problem: Adult Inpatient Plan of Care  Goal: Absence of Hospital-Acquired Illness or Injury  Outcome: Ongoing, Progressing     POC reviewed with the patient and they verbalized understanding.waiting for placement. All comments and concerns addressed. Bed locked in lowest position with bed alarm set, call light within reach. Safety precautions maintained. VSS, see flowsheets. . Will continue to monitor for changes to POC and clinical condition.

## 2023-09-06 NOTE — ASSESSMENT & PLAN NOTE
89 yo M with PMHx of AAA, AICD, and HTN presents as a transfer from [facility] for HLOC and possible intervention. Initially presented after found down by family with left sided weakness, facial droop and dysarthria. LKN ~2hrs PTA. At baseline is highly functional and performs all of his ADLs independently. Telestroke with Dr. Delvalle, NIHSS 4 initially but improved to NIHSS 2. Not TNK candidate due to low NIHSS/improving symptoms. CTH negative, CTA concerning for R M2 occlusion. At [facility] NIHSS 5, he was taken to IR for possible intervention however thrombectomy unsuccessful despite multiple attempts by IR team. CTH post IR with R MCA frontal opercular infarct, possible SAH vs contrast and new R temporal IPH. Serial CTHs with stable ICH/SAH and evolving R MCA infarct. Echo with EF 55-60% and severe LAE. Stroke etiology ESUS at this time, will order cardiac event monitor at discharge for further evaluation.    NAEO, neuro exam stable. Repeat CTH yesterday stable. Dispo recs for IPR and placement pending, anticipate discharge today.        Antithrombotics for secondary stroke prevention: Antiplatelets: None: Intracerebral Hemorrhage    Statins for secondary stroke prevention and hyperlipidemia, if present:   Statins: Atorvastatin- 40 mg daily    Aggressive risk factor modification: HTN, AAA     Rehab efforts: The patient has been evaluated by a stroke team provider and the therapy needs have been fully considered based off the presenting complaints and exam findings. The following therapy evaluations are needed: PT evaluate and treat, OT evaluate and treat, SLP evaluate and treat, PM&R evaluate for appropriate placement    Diagnostics ordered/pending: none    VTE prophylaxis: Mechanical prophylaxis: Place SCDs, family refused SQH    BP parameters:SBP<160

## 2023-09-06 NOTE — PLAN OF CARE
09/06/23 1505   Final Note   Assessment Type Final Discharge Note   Anticipated Discharge Disposition Rehab   Post-Acute Status   Post-Acute Authorization Placement   Post-Acute Placement Status Set-up Complete/Auth obtained     Patient accepted at [facility] in [place].  SUSAN provided RN with number for report and ambo transport was set up for 3pm.  SUSAN notified dtr Lesa.      Maru Tellez, BOBBY  [facility]  [phone]

## 2023-09-06 NOTE — NURSING
MIKHAIL Bryant report given to. Vss, no acute distress; transferred via [facility]. Family at bedside.

## 2023-09-06 NOTE — PROGRESS NOTES
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 Initially presented after found down by family with left sided weakness, facial droop and dysarthria. LKN ~2hrs PTA. At baseline is highly functional and performs all of his ADLs independently. Telestroke with Dr. Delvalle, NIHSS 4 initially but improved to NIHSS 2. Not TNK candidate due to low NIHSS/improving symptoms. CTH negative, CTA concerning for R M2 occlusion. At [facility] NIHSS 5, he was taken to IR for possible intervention however thrombectomy unsuccessful despite multiple attempts by IR team. CTH post IR with R MCA frontal opercular infarct, possible SAH vs contrast and new R temporal IPH. Serial CTHs with stable ICH/SAH and evolving R MCA infarct. Echo with EF 55-60% and severe LAE. Stroke etiology ESUS at this time, will order cardiac event monitor at discharge for further evaluation.    NAEO, neuro exam stable. Repeat CTH yesterday stable. Dispo recs for IPR and placement pending, anticipate discharge today.        Antithrombotics for secondary stroke prevention: Antiplatelets: None: Intracerebral Hemorrhage    Statins for secondary stroke prevention and hyperlipidemia, if present:   Statins: Atorvastatin- 40 mg daily    Aggressive risk factor modification: HTN, AAA     Rehab efforts: The patient has been evaluated by a stroke team provider and the therapy needs have been fully considered based off the presenting complaints and exam findings. The following therapy evaluations are needed: PT evaluate and treat, OT evaluate and treat, SLP evaluate and treat, PM&R evaluate for appropriate placement    Diagnostics ordered/pending: none    VTE prophylaxis: Mechanical prophylaxis: Place SCDs, family refused SQH    BP  parameters:SBP<160        Intraparenchymal hemorrhage of brain  See principal problem   R temporal IPH      AICD (automatic cardioverter/defibrillator) present  AICD not MR compatible    Primary hypertension  Stroke RF  SBP<160  At goal without needing anti-hypertensives    Left-sided weakness  Due to stroke  Aggressive therapy  PT/OT eval and treat  Dispo recs for IPR         9/3/2023 Patient was taken to IR yesterday, no successful intervention TICI0 of R M2. CTH post IR with R MCA frontal opercular infarct, possible SAH vs contrast and new R temporal IPH. Exam notable for R gaze(crosses midline), LUE/LLE drift, dysarthria, L hemihypoesthesia and L neglect. Recommend holding ASA in setting of possible new IPH. Will continue to follow.   9/4/2023 Neuro exam stable. Working with therapy. Recs for IPR. Daughter requesting spacing out of neurochecks and vital checks. Patient is ok for SD.   9/5/2023 mild dysarthria, left sided hemiparesis, left neglect and no drift.  Neuro exam stable. ECHO was done with EF 55-60%, and LA severely dilated.  Pt has a pacemaker which incompatible with MRI.  Plan to repeat CT head today.  09/06/2023 NAEO, neuro exam stable. Repeat CTH yesterday stable. Dispo recs for IPR and placement pending, anticipate discharge possibly today or tomorrow.      STROKE DOCUMENTATION   Acute Stroke Times   Last Known Normal Date: 09/02/23  Last Known Normal Time: 1450  Symptom Onset Date: 09/02/23  Symptom Onset Time: 1450  Stroke Team Called Date: 09/02/23  Stroke Team Called Time: 2024  Stroke Team Arrival Date: 09/02/23  Stroke Team Arrival Time: 2025  CT Interpretation Time:  (done at OSH, none done on arrival)  Thrombolytic Therapy Recommended: No  Thrombectomy Recommended: Yes  Decision to Treat Time for IR:  (decision made by IR team prior to arrival around 1934)    NIH Scale:  1a. Level of Consciousness: 1-->Not alert, but arousable by minor stimulation to obey, answer, or respond  1b. LOC  Questions: 0-->Answers both questions correctly  1c. LOC Commands: 0-->Performs both tasks correctly  2. Best Gaze: 1-->Partial gaze palsy, gaze is abnormal in one or both eyes, but forced deviation or total gaze paresis is not present  3. Visual: 2-->Complete hemianopia  4. Facial Palsy: 1-->Minor paralysis (flattened nasolabial fold, asymmetry on smiling)  5a. Motor Arm, Left: 0-->No drift, limb holds 90 (or 45) degrees for full 10 secs  5b. Motor Arm, Right: 0-->No drift, limb holds 90 (or 45) degrees for full 10 secs  6a. Motor Leg, Left: 0-->No drift, leg holds 30 degree position for full 5 secs  6b. Motor Leg, Right: 0-->No drift, leg holds 30 degree position for full 5 secs  7. Limb Ataxia: 0-->Absent  8. Sensory: 0-->Normal, no sensory loss  9. Best Language: 1-->Mild-to-moderate aphasia, some obvious loss of fluency or facility of comprehension, without significant limitation on ideas expressed or form of expression. Reduction of speech and/or comprehension, however, makes conversation. . . (see row details)  10. Dysarthria: 1-->Mild-to-moderate dysarthria, patient slurs at least some words and, at worst, can be understood with some difficulty  11. Extinction and Inattention (formerly Neglect): 0-->No abnormality  Total (NIH Stroke Scale): 7       Modified L[place]er Score: 0  Raghavendra Coma Scale:    ABCD2 Score:    [id number] Score:   HAS -BLED Score:   ICH Score:   Hunt & Danielle Classification:      Hemorrhagic change of an Ischemic Stroke: Does this patient have an ischemic stroke with hemorrhagic changes? Yes, Grading Scale: PH Type 1 (PH-1) = hematoma in < 30% of the infarcted area with some slight space-occupying effect. Is this a symptomatic change?  No - Hemorrhage is not clinically significant     Neurologic Chief Complaint: R MCA stroke    Subjective:     Interval History: Patient is seen for follow-up neurological assessment and treatment recommendations:   NAEO, neuro exam stable. Repeat CTH  yesterday stable. Dispo recs for IPR and placement pending, anticipate discharge possibly today or tomorrow.     HPI, Past Medical, Family, and Social History remains the same as documented in the initial encounter.     Review of Systems   Constitutional:  Negative for fever.   HENT:  Negative for drooling and trouble swallowing.    Eyes:  Positive for visual disturbance.   Respiratory:  Negative for shortness of breath.    Cardiovascular:  Negative for chest pain.   Gastrointestinal:  Negative for nausea and vomiting.   Neurological:  Positive for facial asymmetry and speech difficulty. Negative for weakness.     Scheduled Meds:   atorvastatin  40 mg Oral Daily    famotidine  20 mg Oral Daily     Continuous Infusions:  PRN Meds:acetaminophen, albuterol sulfate, sodium chloride 0.9%, sodium chloride 0.9%    Objective:     Vital Signs (Most Recent):  Temp: 97 °F (36.1 °C) (09/06/23 1055)  Pulse: 82 (09/06/23 1055)  Resp: 18 (09/06/23 1055)  BP: (!) 148/83 (09/06/23 1055)  SpO2: 95 % (09/06/23 1055)  BP Location: Left arm    Vital Signs Range (Last 24H):  Temp:  [97 °F (36.1 °C)-97.9 °F (36.6 °C)]   Pulse:  []   Resp:  [16-18]   BP: (108-148)/(72-83)   SpO2:  [94 %-97 %]   BP Location: Left arm       Physical Exam  Vitals and nursing note reviewed.   Constitutional:       General: He is sleeping. He is not in acute distress.  HENT:      Head: Normocephalic.      Nose: Nose normal.      Mouth/Throat:      Mouth: Mucous membranes are moist.   Eyes:      General: Visual field deficit present.      Conjunctiva/sclera: Conjunctivae normal.   Cardiovascular:      Rate and Rhythm: Normal rate.   Pulmonary:      Effort: Pulmonary effort is normal. No respiratory distress.   Abdominal:      General: There is no distension.   Musculoskeletal:         General: No deformity.      Cervical back: No rigidity.   Skin:     General: Skin is warm.   Neurological:      Cranial Nerves: Dysarthria and facial asymmetry present.       Sensory: No sensory deficit.      Motor: No weakness.          Neurological Exam:   LOC: sleepy, easily arouses  Attention Span: Good   Language: Expressive aphasia  Articulation: Dysarthria  Orientation: Not oriented to time  Visual Fields: Visual neglect  EOM (CN III, IV, VI): Gaze preference  right  Facial Movement (CN VII): Lower facial weakness on the Left  Motor: moves all extremities AG  Sensation: Intact to light touch    Laboratory:  CMP:   Recent Labs   Lab 09/06/23 0523   CALCIUM 9.3   ALBUMIN 3.6   PROT 6.9      K 4.2   CO2 19*      BUN 22   CREATININE 1.1   ALKPHOS 94   ALT 12   AST 28   BILITOT 1.1*       CBC:   Recent Labs   Lab 09/06/23 0523   WBC 7.91   RBC 4.38*   HGB 14.1   HCT 42.8   *   MCV 98   MCH 32.2*   MCHC 32.9       Lipid Panel:   Recent Labs   Lab 09/03/23 0027   CHOL 162   LDLCALC 101.6   HDL 40   TRIG 102       Coagulation:   Recent Labs   Lab 09/03/23 0027   INR 1.0   APTT 31.6       Hgb A1C:   Recent Labs   Lab 09/03/23  0027   HGBA1C 5.4       TSH:   Recent Labs   Lab 09/03/23  0027   TSH 0.273*         Diagnostic Results     Brain/Vessel Imaging   CTH without contrast 9/5/2023  Impression:  Evolving region of right MCA territory infarction with superimposed parenchymal hemorrhage.  Scattered subarachnoid and intraventricular hemorrhage relatively similar  No significant new hemorrhage or new abnormal parenchymal attenuation  Ventricles stable without evidence for hydrocephalus  Clinical correlation and close follow-up recommended.    CTH without contrast 9/3/2023 1218  Impression:  Stable appearance of the brain with recent right MCA infarct.  Stable intracranial hemorrhage.  No midline shift.     CTH without contrast 9/3/2023 0805  Impression:  Right MCA frontal opercular infarct.  Prominent subarachnoid hemorrhage/contrast.  New right temporal lobe intraparenchymal hemorrhage with trace intraventricular hemorrhage.  No significant midline shift     CTH  without contrast 9/2/2023  Impression:  Moderate subarachnoid hemorrhage in the sylvian fissure and in the gyri of the temporoparietal lobe on the right.  Residual contrast within the [id number]-Hkrttq vessels from previous angiogram.  No evidence of intraparenchymal or interventricular hemorrhage.  No evidence of cortical infarct.      CTA at OSH per paper read with R MCA occlusion      Vessel Imaging  IR Cerebral Angiogram 9/2/2023  Impression:  Cerebral angiogram demonstrated right M2 occlusion.  Numerous attempts at thrombectomy were performed, but only minimal reperfusion was achieved.  No complications were noted at the end of the procedure. There is still collateral flow to this territory provided by CABRERA leptomeningeal branches.  There was TICI 2A reperfusion.       Cardiac Imaging   TTE with bubble 9/3/2023    Left Ventricle: The left ventricle is normal in size. Increased wall thickness. There is concentric remodeling. Normal wall motion. There is normal systolic function with a visually estimated ejection fraction of 55 - 60%. There is indeterminate diastolic function.    Right Ventricle: Normal right ventricular cavity size. Right ventricle wall motion  is normal. Systolic function is normal.    Left Atrium: Left atrium is severely dilated. Agitated saline study of the atrial septum is negative, suggesting absence of intracardiac shunt at the atrial level.    Aortic Valve: There is moderate aortic valve sclerosis. Moderately restricted motion. There is mild stenosis. Aortic valve area by VTI is 2.07 cm². Aortic valve peak velocity is 2.13 m/s. Mean gradient is 12 mmHg. The dimensionless index is 0.35. There is mild aortic regurgitation.  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 There is mild to moderate regurgitation with multiple jets.    Pulmonary Artery: The estimated pulmonary artery systolic pressure is 35 mmHg.    IVC/SVC: Intermediate venous pressure at 8 mmHg.    Aorta: Aortic root is  mildly dilated measuring 4.06 cm. Ascending aorta is mildly dilated measuring 4.29 cm.      RILEY Hudson  [facility]  Department of Vascular Neurology   [facility] - Neurosurgery [facility])

## 2023-09-06 NOTE — PT/OT/SLP PROGRESS
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 male with an SLP diagnosis of Dysphagia, Dysarthria, delayed auditory processing, cognitive-linguistic impairments, and Visio-Spatial Impairment. He presents with good progress in session.    Subjective     Patient working with PT at beginning of session while SLP provided family education.   Daughter and granddaughter present in room.   Patient goals: none stated  Daughter states, "He was completely independent before all this."        Objective:     Has the patient been evaluated by SLP for swallowing?   Yes  Keep patient NPO? No     SLP provided family education on SLP role, SLP goals, SLP POC, ways to independently target language and cognition outside of therapy sessions, what to expect at IPR (patient discharging this afternoon), and recommendations to provide SLP at IPR with functional goals and targets. All questions addressed within SLP scope. Patient and family in agreement with POC. Patient seen for ongoing cognitive-linguistic therapy. Patient with continued noted left neglect. Family reports neglect appears to be on right side, as well. He completed a clock drawing task with the numbers 1-5 written in the left upper quadrant with lines extending from each number. He also mike a second [place]. Patient named his grandchildren " with min cues. He recalled functional information provided to him immediately prior to hospitalization. Patient falling asleep and discharging to [facility] in 1.5 hours. ST session ended.     Goals:   Multidisciplinary Problems       SLP Goals          Problem: SLP    Goal Priority Disciplines Outcome   SLP Goal     SLP    Description: Goals expected to be met by 9/10:  1. Pt will tolerate least restrictive diet without overt s/sx airway threat.   2. Pt will participate in cognitive-linguistic evaluation to further develop POC.                            Plan:     Patient to be seen:  4 x/week   Plan of Care expires:  10/02/23  Plan of Care reviewed with:  patient, daughter, grandchild(amish)   SLP Follow-Up:  Yes       Discharge recommendations:  rehabilitation facility   Barriers to Discharge:  None    Time Tracking:     SLP Treatment Date:   09/06/23  Speech Start Time:  1254  Speech Stop Time:  1345     Speech Total Time (min):  51 min    Billable Minutes: Treatment Swallowing Dysfunction 25 and Self Care/Home Management Training 26    09/06/2023

## 2023-09-06 NOTE — PHYSICIAN QUERY
PT Name: London Ryder  MR #: [id number]    DOCUMENTATION CLARIFICATION     CDS/: Dannielle Hawthorne RN, CDS               Contact information: [email or web address]      This form is a permanent document in the medical record.     Query Date: September 6, 2023    By submitting this query, we are merely seeking further clarification of documentation. Please utilize your independent clinical judgment when addressing the question(s) below.    The Medical Record contains the following:  Clinical Findings Location in Medical Record     -- transfer from [facility] for possible Neuro IR intervention of new onset L  weakness, L facial droop, and dysarthria.   --Telestroke was started w/ Dr. Delvalle. He was noted at that time to have a documented NIH of 4 w/ improvement of symptoms (specifically LSW). Decision was made not to give thrombolytics, but transfer to [facility] for evaluation for possible IR.  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 R gaze, able to come to midline, but not pass midline        - NIH 8        --Hourly neuro checks and vital signs  --Cytotoxic brain edema  Secondary to primary problem  -Follow hourly neurochecks    --Hospital Course:             -09/03/2023: Patient was taken to IR with no thrombectomy done.  Patient doing well after procedure, repeat CT head done which was stable. Holding ASA for 7-10 days after procedure.            -09/04/2023: NAEON. 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  No hydrocephalus is seen.    --Mass effect with partial compression right lateral ventricle without significant midline shift  --Ventricles stable without evidence for hydrocephalus  --Evolving region of right MCA territory infarction with superimposed parenchymal hemorrhage.      CT 9/3          CT 9/5     For reporting purposes, the definition for other diagnoses is interpreted as additional conditions that affect patient care in terms of requiring: clinical evaluation; or therapeutic treatment; or diagnostic procedures; or extended length of hospital stay; or increased nursing care and/or monitoring. (ICD-10-CM Official Guidelines for Coding and Reporting FY 2021)     After study, the diagnosis of cerebral edema is:    [   ] Clinically significant diagnosis that was monitored and/or treated as follows (please specify): ___________   [ X ] Present but was inherent to the stroke   [   ] Other explanation (please specify): _________________   [  ] Clinically Undetermined       Please document in your progress notes daily for the duration of treatment until resolved, and include in your discharge summary.    Reference:  ICD-10-CM Official Guidelines for Coding and Reporting FY 2021. (2020). Retrieved April 7, 2021, from https://www.cdc.gov/nchs/data/icd/10cmguidelines-FY2021.pdf?[email or web address]       Form No. [id number]

## 2023-09-11 DIAGNOSIS — R13.10 DYSPHAGIA: Primary | ICD-10-CM

## 2023-12-04 PROBLEM — I63.411 STROKE DUE TO EMBOLISM OF RIGHT MIDDLE CEREBRAL ARTERY: Status: RESOLVED | Noted: 2023-09-02 | Resolved: 2023-12-04
